# Patient Record
Sex: MALE | Race: OTHER | Employment: FULL TIME | URBAN - METROPOLITAN AREA
[De-identification: names, ages, dates, MRNs, and addresses within clinical notes are randomized per-mention and may not be internally consistent; named-entity substitution may affect disease eponyms.]

---

## 2019-04-05 ENCOUNTER — OFFICE VISIT (OUTPATIENT)
Dept: SURGERY | Age: 49
End: 2019-04-05

## 2019-04-05 ENCOUNTER — TELEPHONE (OUTPATIENT)
Dept: SURGERY | Age: 49
End: 2019-04-05

## 2019-04-05 VITALS
WEIGHT: 246.2 LBS | DIASTOLIC BLOOD PRESSURE: 83 MMHG | HEIGHT: 66 IN | SYSTOLIC BLOOD PRESSURE: 145 MMHG | BODY MASS INDEX: 39.57 KG/M2 | OXYGEN SATURATION: 99 % | TEMPERATURE: 97.8 F | HEART RATE: 62 BPM

## 2019-04-05 DIAGNOSIS — E66.01 MORBID OBESITY (HCC): Primary | ICD-10-CM

## 2019-04-05 DIAGNOSIS — K76.0 HEPATIC STEATOSIS: ICD-10-CM

## 2019-04-05 RX ORDER — AMOXICILLIN 500 MG/1
500 CAPSULE ORAL 3 TIMES DAILY
Qty: 42 CAP | Refills: 0 | Status: SHIPPED | OUTPATIENT
Start: 2019-04-05 | End: 2019-04-19

## 2019-04-05 RX ORDER — OMEPRAZOLE 20 MG/1
20 CAPSULE, DELAYED RELEASE ORAL 2 TIMES DAILY
Qty: 28 CAP | Refills: 0 | Status: SHIPPED | OUTPATIENT
Start: 2019-04-05 | End: 2019-04-19

## 2019-04-05 RX ORDER — CLARITHROMYCIN 500 MG/1
500 TABLET, FILM COATED ORAL 2 TIMES DAILY
Qty: 28 TAB | Refills: 0 | Status: SHIPPED | OUTPATIENT
Start: 2019-04-05 | End: 2019-04-19

## 2019-04-05 RX ORDER — AMLODIPINE BESYLATE 5 MG/1
TABLET ORAL
COMMUNITY
End: 2021-11-11

## 2019-04-05 NOTE — PROGRESS NOTES
Initial Consultation for Bariatric Surgery Template Sly Miller is a 52 y.o. [place] male initially evaluated for consideration of bariatric surgery at [facility] last year. The patient initiated the process there which she completed and his now opted to pursue further care at [facility]. Onset obesity: Age 39 weight 190 pounds on a 5 foot 4 inch frame. Weight at age 25: 160 pounds on a five-point frame. Maximum weight: 268 pounds in 2017. Current weight: 246 pounds on a 5 foot 4 inch frame with a body mass index of 40. Pattern weight gain: Slowly progressive interrupted by dietary weight loss followed by regaining the last weight as well as additional weight that is exhibiting the yo-yo effect up to his maximum weight of 268 pounds in 2017. Maximum medical weight loss attempts: Multiple unsupervised and supervised weight loss trials with a maximum loss occurring in 2017 losing 20 pounds over 6 months. Comorbidities: Hypertension, prediabetes, hypertriglyceridemia, hypercholesterolemia, gastroesophageal reflux disease, CPAP dependent obstructive sleep apnea, reactive airway disease, weight related arthropathyback. Current weight: 246. BMI: 40. Ideal body weight: 145. Excess body weight: 101. Estimated weight loss: 81. Goal weight: 165. Allergies: No known drug allergies. Current medications: See medication list. 
Past medical history: 1. Clinically severe obesity with body mass index of 40 and obesity related comorbidities hypertension, prediabetes, hypertriglyceridemia, hypercholesterolemia, gastroesophageal reflux disease, reactive airway disease, CPAP and obstructive sleep apnea, weight related arthropathyback. 2.  Anxiety/depression. 3.  Depression. Past surgical history: None. Social history: 
Alcohol: None. Tobacco: 1/2 pack of cigarettes daily for 10-year. Quitting in 1991. Family history: Mother 73healthy. Father 79prostate carcinoma, reactive airway disease. Siblings x5healthy Past Medical History:  
Diagnosis Date  Hyperlipidemia  Hypertension  Ill-defined condition   
 elevated cholesterol  Psychiatric disorder   
 anxiety with depression  Sleep apnea   
 uses sleep apnea Past Surgical History:  
Procedure Laterality Date  COLONOSCOPY N/A 2/1/2018 COLONOSCOPY, DIAGNOSTIC with polypectomy  performed by Sue Montalvo MD at [facility] ENDOSCOPY Current Outpatient Medications Medication Sig Dispense Refill  amLODIPine (NORVASC) 5 mg tablet amlodipine 5 mg tablet  atorvastatin (LIPITOR) 40 mg tablet Take 40 mg by mouth two (2) times a day.  ezetimibe (ZETIA) 10 mg tablet Take 10 mg by mouth daily.  sertraline (ZOLOFT) 100 mg tablet Take 200 mg by mouth nightly.  methocarbamol (ROBAXIN-750) 750 mg tablet Take 1 Tab by mouth four (4) times daily. 16 Tab 0  
 albuterol sulfate 90 mcg/actuation aepb Take 2 Puffs by inhalation every four (4) hours as needed. 1 Inhaler 0 No Known Allergies Social History Tobacco Use  Smoking status: Former Smoker  Smokeless tobacco: Never Used Substance Use Topics  Alcohol use: No  
 Drug use: No  
 
 
Family History Problem Relation Age of Onset  Asthma Mother  Cancer Mother  Cancer Father Family Status Relation Name Status  Mother  Alive  Father  Alive Review of Systems:  Positive in BOLD 
 
CONST: Fever, weight loss, fatigue or chills GI: Nausea, vomiting, abdominal pain, change in bowel habits, hematochezia, melena, and GERD INTEG: Dermatitis, abnormal moles HEENT: Recent changes in vision, vertigo, epistaxis, dysphagia and hoarseness CV: Chest pain, palpitations, HTN, edema and varicosities RESP: Cough, shortness of breath, wheezing, hemoptysis, snoring and reactive airway disease : Hematuria, dysuria, frequency, urgency, nocturia and stress urinary incontinence MS: Weakness, joint pain and arthritis ENDO: Diabetes, thyroid disease, polyuria, polydipsia, polyphagia, poor wound healing, heat intolerance, cold intolerance LYMPH/HEME: Anemia, bruising and history of blood transfusions NEURO: Dizziness, headache, fainting, seizures and stroke PSYCH: Anxiety and depression Physical Exam 
 
Visit Vitals /83 (BP 1 Location: Right arm, BP Patient Position: Sitting) Pulse 62 Temp 97.8 °F (36.6 °C) (Oral) Ht 5' 6\" (1.676 m) Wt 111.7 kg (246 lb 3.2 oz) SpO2 99% BMI 39.74 kg/m² General: 52 y.o.) male in no acute distress. Who is morbidly obese HEENT: Normocephalic, atraumatic, Pupils equal and reactive, nasopharynx clear, oropharynx clear and moist without lesions NECK: Supple, no lymphadenopathy, thyromegaly, carotid bruits or jugular venous distension. trachea midline RESP: Clear to auscultation bilaterally, no wheezes, rhonchi, or rales, normal respiratory excursion CV: Regular rate and rhythm, no murmurs, rubs or gallops. 3+/4 pulses in bilateral dorsalis pedis and posterior tibialis. No distal edema or varicosities. ABD: Soft, nontender, nondistended, normoactive bowel sounds, no hernias, no hepatosplenomegaly, Extremities: Warm, well perfused, no tenderness or swelling, normal gait/station Neuro: Sensation and strength grossly intact and symmetrical 
Psych: Alert and oriented to person, place, and time. Laboratory, radiologic, nutrition and psychology consultations have been completed, we are awaiting documentation Impression: 
 
Aline Miller is a 52 y.o. male with clinically severe obesity and a body mass index of 40 with obesity related comorbidities of hypertension, hypercholesterolemia, hypertriglyceridemia, prediabetes, gastroesophageal reflux disease, reactive airway disease, CPAP dependent obstructive sleep apnea, weight related arthropathy and hepatic steatosis    who would benefit from bariatric surgery. We have had an extensive discussion with regard to the risks, benefits and likely outcomes of the operation. We've discussed the restrictive and malabsorptive nature of the gastric bypass and compared and contrasted with the sleeve gastrectomy. The patient understands the likelihood of losing approximately 80% of their excess weight in 12 to 18 months. He also understands the risks including but not limited to bleeding, infection, need for reoperation, ulcers, leaks and strictures, bowel obstruction secondary to adhesions and internal hernias, DVT, PE, heart attack, stroke, and death. Patient also understands risks of inadequate weight loss, excess weight loss, vitamin insufficiency, protein malnutrition, excess skin, and loss of hair. We have reviewed the components of a successful postoperative course including requirement for a high protein, low carbohydrate diet, 60 oz a day of zero calorie liquids, daily vitamin supplementation, daily exercise, regular follow-up, and participation in support groups. At this time we will enroll the patient in our bariatric program, obtain the previously completed laboratory evaluation, chest X-ray, EKG,  and evaluation by  nutritionist as well as psychologist.  Patient has elected after completing his multidisciplinary evaluation to pursue lap scopic potentially open Say-en-Y gastric bypass to achieve definitive durable weight loss on a personal level with expected resolution of obesity related comorbidities

## 2019-04-05 NOTE — PROGRESS NOTES
Shauna Rivera is a 52 y.o. male (: 1970) presenting to address: Chief Complaint Patient presents with  Weight Management Consultation for GBP Medication list and allergies have been reviewed with Shauna Rivera and updated as of today's date. I have gone over all Medical, Surgical and Social History with Shauna Rivera and updated/added the information accordingly.

## 2019-04-05 NOTE — TELEPHONE ENCOUNTER
Patient called and spoke with his wife in regard to positive H-pylori. Notified her that we would be sending in the rx and verified pharmacy. Verbal understanding given by patients wife. Closing encounter.

## 2019-04-05 NOTE — PATIENT INSTRUCTIONS
If you have any questions or concerns about today's appointment, the verbal and/or written instructions you were given for follow up care, please call our office at [phone]. [facility] - [address], [address] 
 
[phone] office [phone]

## 2019-04-29 ENCOUNTER — DOCUMENTATION ONLY (OUTPATIENT)
Dept: SURGERY | Age: 49
End: 2019-04-29

## 2019-04-29 NOTE — PROGRESS NOTES
[address] [facility] [place] [address] [facility], [address]    Patient's Name: oKurtney Cox   Age: 52 y.o. YOB: 1970   Sex: male    Date:  04/11/2019    Insurance:  Medicare            Session: 1 of 4   Surgeon:  Dr. Nilesh Roca    Height: 5'6\"   Weight:    243      Lbs. BMI: 39     Starting Weight: 246       Do you smoke? no    Alcohol intake:    I do not drink at all. Class Guidelines    Guidelines are reviewed with patient at the start of every class. 1. Patient understands that weight loss trial classes must be consecutive. Patient understands if they miss a class, it is their responsibility to contact me to reschedule class. I will reach out to patient after their first no show. 2.  Patient understands the expectations that weight maintenance/weight loss is expected during the classes. Failure to demonstrate changes may result in one extra month of weight loss trial, followed by going back to see the surgeon. Patient understands that they CAN NOT gain any weight during the weight loss trial.  Gaining weight will result in extra classes. 3. Patient is also instructed to be doing their labs, blood work, psych visit, support group and any other test that the surgeon has used while they are working on their weight loss trial.  4.  Patient was instructed to bring their blue binder to every class and appointment. Changes Made Since Last Class: I stopped drinking caffeine. Eating Habits and Behaviors      We started off class today by reviewing key diet principles. Patient was given a very specific list of foods that they can eat, which included meat, fish, vegetables, eggs, cheese, fats, soy, and berries. Patient was also given a list of foods that should be avoided. These included sweets (candy, soda, baked goods, ice cream), and starches, including pasta, rice, crackers, chips, oatmeal, bread.   We talked about appropriate protein-based snacks, including deli meat, low fat cheese, yogurt, hummus, small handful of almonds. I also gave a power point on ways to help with the metabolism. Some of the food-related ways included: Healthy snacking, eating adequate protein, and getting adequate water. Mild dehydration may slow down one's weight loss. Patient's current diet habits include:   Breakfast:  Tuna, eggs, cheese. Lunch: fish, chicken, salmon. Dinner:  Chicken, fish, salmon. Physical Activity/Exercise    Comments:     Currently for exercise, patient is walking. We talked about activities for patient to do, including walking, swimming, or chair exercises. I also talked with patient about doing some strength training, which helps the metabolism, as well. Behavior Modification       Comments:  I discussed behaviors that can help with one's metabolism. These include not skipping meals and being sure to feed and fuel that body rather than going large gaps and putting the body in starvation mode. One goal for next month includes:  1.  Join a gym. Increase exercise.       Ozie Babinski, RD  04/11/2019

## 2019-05-22 ENCOUNTER — DOCUMENTATION ONLY (OUTPATIENT)
Dept: SURGERY | Age: 49
End: 2019-05-22

## 2019-05-22 NOTE — PROGRESS NOTES
[facility] [address]  [place], [place]    Patient's Name: Vasile Chi   Age: 52 y.o. YOB: 1970   Sex: male    Date:   05/09/2019    Insurance:  Medicare            Session: 2 of 4   Surgeon:  Dr. Yuliana Houston    Height: 5'6\"   Weight:    243      Lbs. BMI: 39     Starting Weight: 246        Do you smoke? no    Alcohol intake:    I do not drink at all. Class Guidelines    Guidelines are reviewed with patient at the start of every class. 1. Patient understands that weight loss trial classes must be consecutive. Patient understands if they miss a class, it is their responsibility to contact me to reschedule class. I will reach out to patient after their first no show. 2.  Patient understands the expectations that weight maintenance/weight loss is expected during the classes. Failure to demonstrate changes may result in one extra month of weight loss trial, followed by going back to see the surgeon. Patient understands that they CAN NOT gain any weight during the weight loss trial.  Gaining weight will result in extra classes. 3. Patient is also instructed to be doing their labs, blood work, psych visit, support group and any other test that the surgeon has used while they are working on their weight loss trial.  4.  Patient was instructed to bring their blue binder to every class and appointment. Changes Made Since Last Class:   Reducing portions. Eating Habits and Behaviors      Today in class we talked about the key diet principles. We start off each class talking about these principles, which include cutting out liquid calories and focusing on water or other non-calorie, non-carbonated drinks. We also spent time talking about carbohydrates, including foods that have carbohydrates and the goal to keep daily carbohydrates under 100 grams per day.   Patient was given ideas of meal and snack choices that are lower in carbohydrates and focus more on protein. Patient was encouraged to start trying protein shakes and was given a list of suggestions. The main topic of class today was: Portion Control. We reviewed in class a power point filled with tips on ways to control portions, including using smaller plates, boxing up portions at a restaurant before starting to eat, and not eating from the container, but rather portioning snacks into smaller bags. Patient's were encouraged to food journal, which helps increase awareness of what and how much they are eating. It was emphasized to patient the importance of reading labels and portion sizes, but also applying these portion sizes. Patient was given a list of items that can help to make portion control easier. For example, a deck of cards or a palm of a hand is a proper portion of meat, a fist is a cup or a proper serving of vegetables. Patient was given 10 tips to help with the portion control. Patient's current diet habits include:   Breakfast:always have breakfast.  Lunch:protein shake, fruit, yogurt. Dinner:always have dinner. Physical Activity/Exercise    Comments:     Currently for exercise, patient is walking. Patient was given a list of ideas for activity and was encouraged to incorporate 30 minutes a day into their daily routine. Behavior Modification       Comments: In class, we also focused on the behavior aspects of weight management. This includes being a mindful eater and not eating in front of the TV. Patient is also encouraged to take 20 minutes to eat a meal and eat at a table. One goal for next month includes:  1. Continue what I'm doing.       Owen Renee, CAN  05/09/2019

## 2019-06-24 ENCOUNTER — DOCUMENTATION ONLY (OUTPATIENT)
Dept: SURGERY | Age: 49
End: 2019-06-24

## 2019-06-24 NOTE — PROGRESS NOTES
[facility]  [address] [address], Formerly [facility], [facility]    Patient's Name: Caryn Harmon   Age: 52 y.o. YOB: 1970   Sex: male    Date: 06/13/2019    Insurance:  Daleen Kanner          Session: 3 of  4  Surgeon: Dr. Judith Shankar    Height: 5'6\" Weight:    239      Lbs. BMI: 39     Starting Weight: 246       Do you smoke? no    Alcohol intake:    I do not drink at all. Class Guidelines    Guidelines are reviewed with patient at the start of every class. 1. Patient understands that weight loss trial classes must be consecutive. Patient understands if they miss a class, it is their responsibility to contact me to reschedule class. I will reach out to patient after their first no show. 2.  Patient understands the expectations that weight maintenance/weight loss is expected during the classes. Failure to demonstrate changes may result in one extra month of weight loss trial, followed by going back to see the surgeon. Patient understands that they CAN NOT gain any weight during the weight loss trial.  Gaining weight will result in extra classes. 3. Patient is also instructed to be doing their labs, blood work, psych visit, support group and any other test that the surgeon has used while they are working on their weight loss trial.  4.  Patient was instructed to bring their blue binder to every class and appointment. Changes Made Since Last Class:   Consistent in fluids, exercise ans three meals. Eating Habits and Behaviors    Today in class, I reviewed a hand out with meal ideas for better planning. We also discussed Nutrition, Behavior, and Exercise changes to start working on. Some of the eating behaviors that we discussed included the importance of eating breakfast and a list of sample breakfast foods was provided. We also talked about avoiding liquid calories.   Patient is encouraged to aim for 64 ounces of fluid per day and trying to get them from water, tea bags and water infuser tools. Patient's current diet habits include: Three meals plus healthy between meal snacks. Physical Activity/Exercise    Comments: We talked about exercise. Patient was given reasons of why exercise is so important and how that can help with their long-term success. I have encouraged patient to get a support system to help with the activity. Currently for activity, patient is doing more walking. Behavior Modification       Comments: We also talked about behavior modifications. We talked about eating triggers, such as eating in front of the TV and solutions, such as making the TV a no eating zone. If patient is eating out out of emotion, food will only temporarily solve that. Patient is encouraged to HALT and assess if they are eating because they are Hungry, or out of emotions: Anxious, Lonely, Tired, which the food will only temporarily solve. We also talked about ways to prevent relapse.     Basim Grande, CAN  06/13/2019

## 2019-07-03 ENCOUNTER — DOCUMENTATION ONLY (OUTPATIENT)
Dept: SURGERY | Age: 49
End: 2019-07-03

## 2019-07-21 ENCOUNTER — DOCUMENTATION ONLY (OUTPATIENT)
Dept: SURGERY | Age: 49
End: 2019-07-21

## 2019-07-22 NOTE — PROGRESS NOTES
Nutrition Evaluation    Patient's Name: Chris Ramos   Age: 52 y.o. YOB: 1970   Sex: male    Height: 5'6\" Weight: 238 BMI:  38  Starting Weight:  246        Smoking Status:  no  Alcohol Intake:  I do not drink at all. Changes made during classes include:  Eat 3-4 times per day. Eliminated sweets. Drink lots of water. Two things that patient learned during this weight loss trial:  How to eat healthy. How to take better care of myself. Summary:  I feel that Chris Ramos has demonstrated appropriate diet changes and is ready to move forward with surgery. Patient has been briefed on the importance of the protein drinks, vitamins, and the transition of the diet stages. Patient understands that the long-term diet will focus on protein and vegetables. Patient understand the effects of carbohydrates after surgery and what reactive hypoglycemia is. Patient is aware that they will be attending pre-op class 2 weeks before surgery and will get more detailed information on the post-op diet guidelines. Patient will see me again at 6 weeks post-op. At this 6 week visit, RD will assess how patient is tolerating soft protein and advance to vegetables, if tolerating soft protein without difficulty. Patient will also see RD again at 9 months post-op. This visit will assess patient's compliance with current protocol, including diet, vitamins, protein shakes, and exercise. Post-op diet guidelines will be reinforced. RD is available for questions and to meet with patient outside of the 6 week and 9 month post-op visit. We spent a lot of time talking about the vitamins. Patient understands the importance of being compliant with the diet protocol and the complications and risks that can occur if they are non-compliant with the nutritional protocol. Patient has attended at least one support group.     Candidate for surgery:  Yes    Sony Cuellar RD  07/18/2019

## 2019-07-22 NOTE — PROGRESS NOTES
[facility] [place] [facility] [address]  [place], [place]    Patient's Name: Ness Smith   Age: 52 y.o. YOB: 1970   Sex: male    Date:   07/18/2019    Insurance:  Medicare            Session: 4 of 4   Surgeon: Dr. Jessica Mocnada     Height: 5'6\"   Weight:    238      Lbs. BMI: 39   Starting Weight: 246       Do you smoke? no    Alcohol intake:    I don't drink at all. Class Guidelines    Guidelines are reviewed with patient at the start of every class. 1. Patient understands that weight loss trial classes must be consecutive. Patient understands if they miss a class, it is their responsibility to contact me to reschedule class. I will reach out to patient after their first no show. 2.  Patient understands the expectations that weight maintenance/weight loss is expected during the classes. Failure to demonstrate changes may result in one extra month of weight loss trial, followed by going back to see the surgeon. Patient understands that they CAN NOT gain any weight during the weight loss trial.  Gaining weight will result in extra classes. 3. Patient is also instructed to be doing their labs, blood work, psych visit, support group and any other test that the surgeon has used while they are working on their weight loss trial.  4.  Patient was instructed to bring their blue binder to every class and appointment. Changes Made Since Last Class:   I eat 3-4 times per day. Walking more. Eating Habits and Behaviors    Today in class, we started talking about the key diet principles. We first focused on stopping liquid calories. Patient was also educated on carbohydrates. Patient was instructed to start cutting out bread, rice, and pasta from the diet and start focusing more on meat and vegetables. Patient's current diet habits include: Yogurt, shakes, cheese sticks, deli meat.         Physical Activity/Exercise    Comments: We talked about exercise. Patient was given reasons of why exercise is so important and how that can help with their long-term success. I have encouraged patient to get a support system to help with the activity. To emphasize the importance patients were provided a handout on all the benefits of exercise. Patient were also provided a handout on overcoming barriers to exercise which included and certificate of commitment for them to use as well as a form to track their exercise. All these handouts were discussed in detail. Currently for activity, patient is doing more walking. Behavior Modification       Comments:  Behavior modifications were reinforced. This included not eating in front of the TV, which could lead to bigger portions and eating when one is not hungry. We also talked about the importance of eating 3 meals per day. Patient was encouraged to food journal to keep their daily carbohydrates less than 30 grams per meal.      Goals that patient wants to work on includes:  1. Decrease portions. Conclusion:  Patient has completed a 4 month WLT and has managed to lose 8# implementing what was learned in class. The patient is cleared to proceed from a nutritional perspective.       Candice Cat RD

## 2019-08-07 ENCOUNTER — OFFICE VISIT (OUTPATIENT)
Dept: SURGERY | Age: 49
End: 2019-08-07

## 2019-08-07 VITALS
SYSTOLIC BLOOD PRESSURE: 127 MMHG | HEART RATE: 68 BPM | DIASTOLIC BLOOD PRESSURE: 82 MMHG | HEIGHT: 66 IN | TEMPERATURE: 97.1 F | OXYGEN SATURATION: 96 % | WEIGHT: 245 LBS | BODY MASS INDEX: 39.37 KG/M2

## 2019-08-07 DIAGNOSIS — E78.00 HYPERCHOLESTEREMIA: ICD-10-CM

## 2019-08-07 DIAGNOSIS — E78.1 HYPERTRIGLYCERIDEMIA: ICD-10-CM

## 2019-08-07 DIAGNOSIS — G47.33 OBSTRUCTIVE SLEEP APNEA (ADULT) (PEDIATRIC): ICD-10-CM

## 2019-08-07 DIAGNOSIS — K21.9 GASTROESOPHAGEAL REFLUX DISEASE WITHOUT ESOPHAGITIS: ICD-10-CM

## 2019-08-07 DIAGNOSIS — I10 BENIGN HYPERTENSION: ICD-10-CM

## 2019-08-07 DIAGNOSIS — E66.01 MORBID OBESITY (HCC): Primary | ICD-10-CM

## 2019-08-07 NOTE — PROGRESS NOTES
Luis Rose is a 52 y.o. male (: 1970) presenting to address:    Chief Complaint   Patient presents with    Pre-op Exam    Weight Management       Medication list and allergies have been reviewed with Luis Rose and updated as of today's date. I have gone over all Medical, Surgical and Social History with Luis Rose and updated/added the information accordingly. 1. Have you been to the ER, Urgent Care or Hospitalized since your last visit? NO      2. Have you followed up with your PCP or any other Physicians since your procedure/ last office visit?    NO

## 2019-08-07 NOTE — PROGRESS NOTES
Preop History and Physical Exam:    Randolph Barboza is a 52 y.o. male who was initially evaluated in this office on 5 April 2019 for discussion of surgical options available for the definitive treatment of his clinically severe obesity. 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 After discussion of the surgical options the patient at that time like to pursue laparoscopic potentially open Say-en-Y gastric bypass to achieve definitive weight loss on a personal as expected resolution of his obesity related comorbidities. The patient presents today after completing all multidisciplinary requirements necessary prior to pursuit of surgery for review of the diagnostic work-up and surgical scheduling noting no new medical or surgical history. Current weight: 245 pounds on a 5 foot 8 and frame with a body mass index of 40, ideal body weight 145, excess body weight 101, estimated postsurgical weight loss, 80 estimate postsurgical goal weight 164      Past Medical History:   Diagnosis Date    Hyperlipidemia     Hypertension     Ill-defined condition     elevated cholesterol    Psychiatric disorder     anxiety with depression    Sleep apnea     uses sleep apnea       Past Surgical History:   Procedure Laterality Date    COLONOSCOPY N/A 2/1/2018    COLONOSCOPY, DIAGNOSTIC with polypectomy  performed by Jayla Romo MD at [facility] ENDOSCOPY       Current Outpatient Medications   Medication Sig Dispense Refill    amLODIPine (NORVASC) 5 mg tablet amlodipine 5 mg tablet      methocarbamol (ROBAXIN-750) 750 mg tablet Take 1 Tab by mouth four (4) times daily.  16 Tab 0    albuterol sulfate 90 mcg/actuation aepb Take 2 Puffs by inhalation every four (4) hours as needed. 1 Inhaler 0    atorvastatin (LIPITOR) 40 mg tablet Take 40 mg by mouth two (2) times a day.  ezetimibe (ZETIA) 10 mg tablet Take 10 mg by mouth daily.  sertraline (ZOLOFT) 100 mg tablet Take 200 mg by mouth nightly.          No Known Allergies    Social History     Tobacco Use    Smoking status: Former Smoker    Smokeless tobacco: Never Used   Substance Use Topics    Alcohol use: No    Drug use: No       Family History   Problem Relation Age of Onset    Asthma Mother     Cancer Mother     Cancer Father        Review of Systems:  Positive in BOLD    CONST: Fever, weight loss, fatigue or chills  GI: Nausea, vomiting, abdominal pain, change in bowel habits, hematochezia, melena, and GERD   INTEG: Dermatitis, abnormal moles  HEENT: Recent changes in vision, vertigo, epistaxis, dysphagia and hoarseness  CV: Chest pain, palpitations, HTN, edema and varicosities  RESP: Cough, shortness of breath, wheezing, hemoptysis, snoring and reactive airway disease  : Hematuria, dysuria, frequency, urgency, nocturia and stress urinary incontinence   MS: Weakness, joint pain and arthritis  ENDO: Diabetes, thyroid disease, polyuria, polydipsia, polyphagia, poor wound healing, heat intolerance, cold intolerance  LYMPH/HEME: Anemia, bruising and history of blood transfusions  NEURO: Dizziness, headache, fainting, seizures and stroke  PSYCH: Anxiety and depression    Physical Exam    Visit Vitals  /82 (BP 1 Location: Right arm, BP Patient Position: Sitting)   Pulse 68   Temp 97.1 °F (36.2 °C) (Oral)   Ht 5' 6\" (1.676 m)   Wt 111.1 kg (245 lb)   SpO2 96%   BMI 39.54 kg/m²         General: Morbidly obese 52 y.o.) male in no acute distress  Head: Normocephalic, atraumatic  Mouth: Clear, no overt lesions, oral mucosa pink and moist  Neck: Supple, no masses, no adenopathy or carotid bruits, trachea midline  Resp: Clear to auscultation bilaterally, now wheeze, rhonchi, or rales, excursions normal and symmetrical  Cardio: Regular rate and rhythm, no murmurs, clicks, gallops, or rubs. No edema or varicosities. Abdomen: Obese, soft, nontender, nondistended, normoactive bowel sounds, no hernias, no hepatosplenomegaly,costal margins, (gynecoid/android/mixed) distribution  Extremities: Warm, well perfused, no tenderness or swelling, normal gait/station  Neuro: Sensation and strength grossly intact and symmetrical  Psych: Alert and oriented to person, place, and time. Studies:    March 20, 2019 CBC, CMP, cholesterol panel, TSH, urinalysis, H. pylori serology, vitamin B1, vitamin B12, folate, iron, hemoglobin A1c                             Carboxyhemoglobin 4.0, IgG H. pylori 4.89 status post treatment, folate 19.4, triglycerides 221 with otherwise a normal laboratory profile  May 22, 2019 chest x-ray: No active cardiopulmonary disease. May 18, 2018 of current ultrasound: Fatty infiltration of the liver  6/24/2019 nutrition consultation concurring with pursuit of surgery. March 14, 2019 psychology consultation: Concurring with pursuit of surgery. March 20, 2019 EKG: Normal sinus rhythm with a rate of 59, right bundle branch block, T wave abnormality    Impression:    Michelle Parrish is a 52 y.o. male who is suffering from morbid obesity and comorbidities including hypertension, hypertriglyceridemia, hypercholesterolemia, gastroesophageal reflux disease, reactive airway disease, obstructive sleep apnea, weight related arthropathy-back who has now completed all multidisciplinary requirements necessary prior to the pursuit of bariatric surgery. We've discussed the restrictive and malabsorptive nature of the gastric bypass and compared and contrasted with the sleeve gastrectomy. The patient understands the likelihood of losing approximately 80% of their excess weight in 12 to 18 months.   Patient is like to pursue laparoscopic potentially open Say-en-Y gastric bypass to achieve definitive durable weight loss on a personal level with expected resolution of obesity related comorbidities and  understands the risks including but not limited to bleeding, infection, need for reoperation, anastomotic ulcers, leaks and strictures, bowel obstruction secondary to adhesions and internal hernias, DVT, PE, heart attack, stroke, and death. Patient also understands risks of inadequate weight loss, excess weight loss, vitamin insufficiency, protein malnutrition, excess skin, and loss of hair. We have reviewed the components of a successful postoperative course including requirement for a high protein, low carbohydrate diet, 60 oz a day of zero calorie liquids, daily vitamin supplementation, daily exercise, regular follow-up, and participation in support groups. We have reviewed the preoperative liver shrinking clear liquid diet, as well as reviewed any medication changes required while on the clear liquid diet. In addition, the patient understands that all medications to be taken during the first 8 weeks postoperatively can be taken whole as long as the medication is approximately the size of a Calixto 325 mg aspirin tablet in size. The patient further understands that it is his/her responsibility to review these and verify with their primary care doctor and pharmacist that all medications are of the appropriate size. We will schedule the patient for laparoscopic gastric bypass  in the near future.

## 2019-08-08 ENCOUNTER — HOSPITAL ENCOUNTER (OUTPATIENT)
Dept: PREADMISSION TESTING | Age: 49
Discharge: HOME | End: 2019-08-08
Payer: MEDICARE

## 2019-08-08 DIAGNOSIS — E78.00 HYPERCHOLESTEREMIA: ICD-10-CM

## 2019-08-08 DIAGNOSIS — I10 BENIGN HYPERTENSION: ICD-10-CM

## 2019-08-08 DIAGNOSIS — K21.9 GASTROESOPHAGEAL REFLUX DISEASE WITHOUT ESOPHAGITIS: ICD-10-CM

## 2019-08-08 DIAGNOSIS — E66.01 SEVERE OBESITY (HCC): Primary | ICD-10-CM

## 2019-08-08 DIAGNOSIS — E78.1 HYPERTRIGLYCERIDEMIA: ICD-10-CM

## 2019-08-08 DIAGNOSIS — G47.33 OBSTRUCTIVE SLEEP APNEA (ADULT) (PEDIATRIC): ICD-10-CM

## 2019-08-08 DIAGNOSIS — E66.01 SEVERE OBESITY (HCC): ICD-10-CM

## 2019-08-08 LAB
25(OH)D3 SERPL-MCNC: 16.9 NG/ML (ref 30–100)
ALBUMIN SERPL-MCNC: 3.8 G/DL (ref 3.4–5)
ALBUMIN/GLOB SERPL: 1.1 {RATIO} (ref 0.8–1.7)
ALP SERPL-CCNC: 99 U/L (ref 45–117)
ALT SERPL-CCNC: 35 U/L (ref 16–61)
ANION GAP SERPL CALC-SCNC: 6 MMOL/L (ref 3–18)
APPEARANCE UR: CLEAR
AST SERPL-CCNC: 21 U/L (ref 10–38)
ATRIAL RATE: 57 BPM
BACTERIA URNS QL MICRO: NEGATIVE /HPF
BASOPHILS # BLD: 0 K/UL (ref 0–0.1)
BASOPHILS NFR BLD: 1 % (ref 0–2)
BILIRUB SERPL-MCNC: 0.3 MG/DL (ref 0.2–1)
BILIRUB UR QL: NEGATIVE
BUN SERPL-MCNC: 12 MG/DL (ref 7–18)
BUN/CREAT SERPL: 12 (ref 12–20)
CALCIUM SERPL-MCNC: 9.2 MG/DL (ref 8.5–10.1)
CALCULATED P AXIS, ECG09: 61 DEGREES
CALCULATED R AXIS, ECG10: 60 DEGREES
CALCULATED T AXIS, ECG11: 84 DEGREES
CHLORIDE SERPL-SCNC: 103 MMOL/L (ref 100–111)
CHOLEST SERPL-MCNC: 285 MG/DL
CO2 SERPL-SCNC: 29 MMOL/L (ref 21–32)
COLOR UR: YELLOW
CREAT SERPL-MCNC: 1.01 MG/DL (ref 0.6–1.3)
DIAGNOSIS, 93000: NORMAL
DIFFERENTIAL METHOD BLD: ABNORMAL
EOSINOPHIL # BLD: 0.1 K/UL (ref 0–0.4)
EOSINOPHIL NFR BLD: 2 % (ref 0–5)
EPITH CASTS URNS QL MICRO: NEGATIVE /LPF (ref 0–5)
ERYTHROCYTE [DISTWIDTH] IN BLOOD BY AUTOMATED COUNT: 13 % (ref 11.6–14.5)
EST. AVERAGE GLUCOSE BLD GHB EST-MCNC: 120 MG/DL
FERRITIN SERPL-MCNC: 145 NG/ML (ref 8–388)
FOLATE SERPL-MCNC: >20 NG/ML (ref 3.1–17.5)
GLOBULIN SER CALC-MCNC: 3.5 G/DL (ref 2–4)
GLUCOSE SERPL-MCNC: 109 MG/DL (ref 74–99)
GLUCOSE UR STRIP.AUTO-MCNC: NEGATIVE MG/DL
HBA1C MFR BLD: 5.8 % (ref 4.2–5.6)
HCT VFR BLD AUTO: 43.9 % (ref 36–48)
HDLC SERPL-MCNC: 39 MG/DL (ref 40–60)
HDLC SERPL: 7.3 {RATIO} (ref 0–5)
HGB BLD-MCNC: 15.1 G/DL (ref 13–16)
HGB UR QL STRIP: NEGATIVE
IRON SERPL-MCNC: 105 UG/DL (ref 50–175)
KETONES UR QL STRIP.AUTO: NEGATIVE MG/DL
LDLC SERPL CALC-MCNC: ABNORMAL MG/DL (ref 0–100)
LEUKOCYTE ESTERASE UR QL STRIP.AUTO: NEGATIVE
LIPID PROFILE,FLP: ABNORMAL
LYMPHOCYTES # BLD: 1.9 K/UL (ref 0.9–3.6)
LYMPHOCYTES NFR BLD: 30 % (ref 21–52)
MCH RBC QN AUTO: 30.5 PG (ref 24–34)
MCHC RBC AUTO-ENTMCNC: 34.4 G/DL (ref 31–37)
MCV RBC AUTO: 88.7 FL (ref 74–97)
MONOCYTES # BLD: 0.5 K/UL (ref 0.05–1.2)
MONOCYTES NFR BLD: 8 % (ref 3–10)
NEUTS SEG # BLD: 3.8 K/UL (ref 1.8–8)
NEUTS SEG NFR BLD: 59 % (ref 40–73)
NITRITE UR QL STRIP.AUTO: NEGATIVE
P-R INTERVAL, ECG05: 142 MS
PH UR STRIP: 7.5 [PH] (ref 5–8)
PLATELET # BLD AUTO: 268 K/UL (ref 135–420)
PMV BLD AUTO: 12.3 FL (ref 9.2–11.8)
POTASSIUM SERPL-SCNC: 4.2 MMOL/L (ref 3.5–5.5)
PROT SERPL-MCNC: 7.3 G/DL (ref 6.4–8.2)
PROT UR STRIP-MCNC: NEGATIVE MG/DL
Q-T INTERVAL, ECG07: 410 MS
QRS DURATION, ECG06: 102 MS
QTC CALCULATION (BEZET), ECG08: 399 MS
RBC # BLD AUTO: 4.95 M/UL (ref 4.7–5.5)
RBC #/AREA URNS HPF: NEGATIVE /HPF (ref 0–5)
SODIUM SERPL-SCNC: 138 MMOL/L (ref 136–145)
SP GR UR REFRACTOMETRY: 1.01 (ref 1–1.03)
TRIGL SERPL-MCNC: 471 MG/DL (ref ?–150)
TSH SERPL DL<=0.05 MIU/L-ACNC: 0.86 UIU/ML (ref 0.36–3.74)
UROBILINOGEN UR QL STRIP.AUTO: 0.2 EU/DL (ref 0.2–1)
VENTRICULAR RATE, ECG03: 57 BPM
VIT B12 SERPL-MCNC: 828 PG/ML (ref 211–911)
VLDLC SERPL CALC-MCNC: ABNORMAL MG/DL
WBC # BLD AUTO: 6.4 K/UL (ref 4.6–13.2)
WBC URNS QL MICRO: NEGATIVE /HPF (ref 0–4)

## 2019-08-08 PROCEDURE — 83540 ASSAY OF IRON: CPT

## 2019-08-08 PROCEDURE — 83036 HEMOGLOBIN GLYCOSYLATED A1C: CPT

## 2019-08-08 PROCEDURE — 80053 COMPREHEN METABOLIC PANEL: CPT

## 2019-08-08 PROCEDURE — 82306 VITAMIN D 25 HYDROXY: CPT

## 2019-08-08 PROCEDURE — 82607 VITAMIN B-12: CPT

## 2019-08-08 PROCEDURE — 84443 ASSAY THYROID STIM HORMONE: CPT

## 2019-08-08 PROCEDURE — 81001 URINALYSIS AUTO W/SCOPE: CPT

## 2019-08-08 PROCEDURE — 86677 HELICOBACTER PYLORI ANTIBODY: CPT

## 2019-08-08 PROCEDURE — 82728 ASSAY OF FERRITIN: CPT

## 2019-08-08 PROCEDURE — 93005 ELECTROCARDIOGRAM TRACING: CPT

## 2019-08-08 PROCEDURE — 85025 COMPLETE CBC W/AUTO DIFF WBC: CPT

## 2019-08-08 PROCEDURE — 80061 LIPID PANEL: CPT

## 2019-08-08 PROCEDURE — 36415 COLL VENOUS BLD VENIPUNCTURE: CPT

## 2019-08-08 PROCEDURE — 84425 ASSAY OF VITAMIN B-1: CPT

## 2019-08-09 LAB
H PYLORI IGA SER-ACNC: 12.8 UNITS (ref 0–8.9)
H PYLORI IGG SER IA-ACNC: 2.9 INDEX VALUE (ref 0–0.79)
H PYLORI IGM SER-ACNC: 11.3 UNITS (ref 0–8.9)

## 2019-08-10 LAB — VIT B1 BLD-SCNC: 106.4 NMOL/L (ref 66.5–200)

## 2019-08-12 ENCOUNTER — TELEPHONE (OUTPATIENT)
Dept: SURGERY | Age: 49
End: 2019-08-12

## 2019-08-12 DIAGNOSIS — E66.01 SEVERE OBESITY (HCC): ICD-10-CM

## 2019-08-12 DIAGNOSIS — A04.8 H. PYLORI INFECTION: Primary | ICD-10-CM

## 2019-08-12 RX ORDER — OMEPRAZOLE 20 MG/1
20 CAPSULE, DELAYED RELEASE ORAL DAILY
Qty: 14 CAP | Refills: 0 | Status: SHIPPED | OUTPATIENT
Start: 2019-08-12 | End: 2019-08-26

## 2019-08-12 RX ORDER — METRONIDAZOLE 500 MG/1
500 TABLET ORAL 3 TIMES DAILY
Qty: 42 TAB | Refills: 0 | Status: SHIPPED | OUTPATIENT
Start: 2019-08-12 | End: 2019-08-26

## 2019-08-12 RX ORDER — TETRACYCLINE HYDROCHLORIDE 500 MG/1
500 CAPSULE ORAL
Qty: 56 CAP | Refills: 0 | Status: SHIPPED | OUTPATIENT
Start: 2019-08-12 | End: 2019-08-26

## 2019-08-12 NOTE — TELEPHONE ENCOUNTER
When placing medications for H Pylori protocol Amoxicillin and Clarithromycin came up as being a high contraindication with patient's blood pressure medications. Chose the alternative of Tetracycline and Flagyl. Closing encounter.

## 2019-08-12 NOTE — TELEPHONE ENCOUNTER
----- Message from Bere Parisi DO sent at 8/12/2019  8:55 AM EDT -----  Needs vitamin D supplementation, H. pylori treatment

## 2019-08-12 NOTE — TELEPHONE ENCOUNTER
Patient has been called, was currently driving, spoke with Patient's wife and patient, Patient's wife placed me on speaker. Notified to start 5000 u of Vitamin D every day. Notified of H. Pylori and that we would be sending in the medication today. Verbal understanding was given by both patient and his wife. Pharmacy confirmed with patient's wife. Closing encounter.

## 2019-08-13 NOTE — PROGRESS NOTES
8/13/19:  Patient was contacted regarding his Vitamin D level. Patient's level was 16.9. Normal: . Patient was instructed to start 5000 IU per day. Patient understood these instructions.     Elizabeth Andersen MS RD

## 2019-08-20 ENCOUNTER — DOCUMENTATION ONLY (OUTPATIENT)
Dept: BARIATRICS/WEIGHT MGMT | Age: 49
End: 2019-08-20

## 2019-08-20 ENCOUNTER — DOCUMENTATION ONLY (OUTPATIENT)
Dept: MEDSURG UNIT | Age: 49
End: 2019-08-20

## 2019-08-20 NOTE — PROGRESS NOTES
CLINICAL NUTRITION PRE-OPERATIVE EDUCATION    Patient's Name: Jojo Mahoney   Age: 52 y.o. YOB: 1970   Sex: male    Education & Materials Provided:   - Soft Protein Diet Shopping List  -  Supplemental Resource Guide: MVI, B12, Calcium Citrate, Vitamin D, Vitamin B1,   and iron recommendations  - Protein Supplement Information  - Fluid Requirements/ No Straws  - No Caffeine or Carbonation   - No alcohol              - No Snacks or No Concentrated Sweets     - Exercising   - Support System at [employer] [facility] of Support Group meetings. Support System after surgery includes: x     - Key Diet Principles            - Addressed Current Habits/Changes to Make   - Patient has been educated on the liquid diet to begin 1 week prior to surgery. Attendance of support group: Yes    Summary:  Patient has completed 4 visits with the Registered Dietitian. During these nutrition visits, we focused on dietary changes, behavior changes, and the importance of establishing an exercise routine. The diet protocol that patient was prescribed emphasized on low carbohydrates (less than 100 grams per day prior to surgery and 60-80 grams of protein per day). At today's session, patient was educated on the post-op diet protocol. Patient understands the importance of keeping total fat and sugar less than 3 grams per serving. Patient is aware of the transition of the diet stages and is aware that they will be on clear liquids for 7days, followed by soft protein for 5 weeks. Patient understands the body needs ~ 60-70 grams of protein per day. During the liquid phase, patient will need 60 grams of protein from shakes. Once eating soft protein, patient will only need 1 shake per day. Patient is aware of the importance of the vitamins and protein drinks. We spent a lot of time talking about the vitamins.   Patient understands the importance of being compliant with the diet protocol and the complications and risks that can occur if they are non-compliant with the nutritional protocol and non-compliant with the vitamins. Patient has also been educated on the pre-op liquid diet for 1 week. Patient understands that failure to comply in pre-op liquid diet could result in surgery being canceled. Patient's 6 week post-op nutrition appointment has been scheduled. At this 6 week visit, RD will assess how patient is tolerating soft protein and advance to vegetables, if tolerating soft protein without difficulty. Patient will also see RD again at 9 months post-op. This visit will assess patient's compliance with current protocol, including diet, vitamins, protein shakes, and exercise. Post-op diet guidelines will be reinforced. RD is available for questions and to meet with patient outside of the 6 week and 9 month post-op visit. Ok to proceed.      Candidate for surgery: Yes  Re-evaluation Date:     Comfort Valencia 87 RD  8/20/2019

## 2019-08-20 NOTE — PROGRESS NOTES
Attended pre op class. Patient was educated on instructions below. Patient was provided a copy and all instructions were understood. Patient  was provided a copy and instructed to call with any questions. Patient was provided phone number to call. Patient verbalized understanding.

## 2019-08-20 NOTE — PROGRESS NOTES
CLINICAL NUTRITION PRE-OPERATIVE EDUCATION    Patient's Name: Andria Cid   Age: 52 y.o. YOB: 1970   Sex: male    Education & Materials Provided:   - Soft Protein Diet Shopping List  -  Supplemental Resource Guide: MVI, B12, Calcium Citrate, Vitamin D, Vitamin B1,   and iron recommendations  - Protein Supplement Information  - Fluid Requirements/ No Straws  - No Caffeine or Carbonation   - No alcohol              - No Snacks or No Concentrated Sweets     - Exercising   - Support System at [employer] [facility] of Support Group meetings. Support System after surgery includes: x     - Key Diet Principles            - Addressed Current Habits/Changes to Make   - Patient has been educated on the liquid diet to begin 1 week prior to surgery. Attendance of support group: Yes    Summary:  Patient has completed 6 visits with the Registered Dietitian. During these nutrition visits, we focused on dietary changes, behavior changes, and the importance of establishing an exercise routine. The diet protocol that patient was prescribed emphasized on low carbohydrates (less than 100 grams per day prior to surgery and 60-80 grams of protein per day). At today's session, patient was educated on the post-op diet protocol. Patient understands the importance of keeping total fat and sugar less than 3 grams per serving. Patient is aware of the transition of the diet stages and is aware that they will be on clear liquids for 7days, followed by soft protein for 5 weeks. Patient understands the body needs ~ 60-70 grams of protein per day. During the liquid phase, patient will need 60 grams of protein from shakes. Once eating soft protein, patient will only need 1 shake per day. Patient is aware of the importance of the vitamins and protein drinks. We spent a lot of time talking about the vitamins.   Patient understands the importance of being compliant with the diet protocol and the complications and risks that can occur if they are non-compliant with the nutritional protocol and non-compliant with the vitamins. Patient has also been educated on the pre-op liquid diet for 1 week. Patient understands that failure to comply in pre-op liquid diet could result in surgery being canceled. Patient's 6 week post-op nutrition appointment has been scheduled. At this 6 week visit, RD will assess how patient is tolerating soft protein and advance to vegetables, if tolerating soft protein without difficulty. Patient will also see RD again at 9 months post-op. This visit will assess patient's compliance with current protocol, including diet, vitamins, protein shakes, and exercise. Post-op diet guidelines will be reinforced. RD is available for questions and to meet with patient outside of the 6 week and 9 month post-op visit. Ok to proceed.      Candidate for surgery: Yes  Re-evaluation Date:     Comfort Tolbert 87 RD  8/20/2019

## 2019-08-30 ENCOUNTER — TELEPHONE (OUTPATIENT)
Dept: SURGERY | Age: 49
End: 2019-08-30

## 2019-08-30 NOTE — TELEPHONE ENCOUNTER
Patient wife call today wants to know why her  bypass was cancel and when will it be reschedule patient wife was really upset she stated she was told to continue clear liquid diet patient has several questions she wants someone to give them a call back.

## 2019-09-05 NOTE — PERIOP NOTES
PAT - SURGICAL PRE-ADMISSION INSTRUCTIONS    NAME:  Mark Anthony Tian                                                          TODAY'S DATE:  9/5/2019    SURGERY DATE:  9/9/2019                                  SURGERY ARRIVAL TIME:   TBV    1. Do NOT eat or drink anything, including candy or gum, after MIDNIGHT on 09/08/2019 , unless you have specific instructions from your Surgeon or Anesthesia Provider to do so. 2. No smoking on the day of surgery. 3. No alcohol 24 hours prior to the day of surgery. 4. No recreational drugs for one week prior to the day of surgery. 5. Leave all valuables, including money/purse, at home. 6. Remove all jewelry, nail polish, makeup (including mascara); no lotions, powders, deodorant, or perfume/cologne/after shave. 7. Glasses/Contact lenses and Dentures may be worn to the hospital.  They will be removed prior to surgery. 8. Call your doctor if symptoms of a cold or illness develop within 24 ours prior to surgery. 9. AN ADULT MUST DRIVE YOU HOME AFTER OUTPATIENT SURGERY. 10. If you are having an OUTPATIENT procedure, please make arrangements for a responsible adult to be with you for 24 hours after your surgery. 11. If you are admitted to the hospital, you will be assigned to a bed after surgery is complete. Normally a family member will not be able to see you until you are in your assigned bed. 15. Family is encouraged to accompany you to the hospital.  We ask visitors in the treatment area to be limited to ONE person at a time to ensure patient privacy. EXCEPTIONS WILL BE MADE AS NEEDED. 15. Children under 12 are discouraged from entering the treatment area and need to be supervised by an adult when in the waiting room.     Special Instructions:    Bring CPAP., Take these medications the morning of surgery with a sip of water:  Amlodipine    Patient Prep:    shower with anti-bacterial soap    These surgical instructions were reviewed with Jayden Juárez (Spouse) during the PAT phone call. Directions: On the morning of surgery, please go to the [address] [facility]. Enter the building from the [facility] entrance, 1st floor (next to the Emergency Room entrance). Take the elevator to the 2nd floor. Sign in at the Registration Desk.     If you have any questions and/or concerns, please do not hesitate to call:  (Prior to the day of surgery)  [facility] unit:  [phone]  (Day of surgery)  [facility] unit:  [phone]

## 2019-09-06 ENCOUNTER — ANESTHESIA EVENT (OUTPATIENT)
Dept: SURGERY | Age: 49
DRG: 621 | End: 2019-09-06
Payer: MEDICARE

## 2019-09-09 ENCOUNTER — HOSPITAL ENCOUNTER (INPATIENT)
Age: 49
LOS: 1 days | Discharge: HOME | DRG: 621 | End: 2019-09-10
Attending: SURGERY | Admitting: SURGERY
Payer: MEDICARE

## 2019-09-09 ENCOUNTER — ANESTHESIA (OUTPATIENT)
Dept: SURGERY | Age: 49
DRG: 621 | End: 2019-09-09
Payer: MEDICARE

## 2019-09-09 DIAGNOSIS — Z98.84 S/P GASTRIC BYPASS: Primary | ICD-10-CM

## 2019-09-09 PROBLEM — E66.01 MORBID OBESITY WITH BMI OF 40.0-44.9, ADULT (HCC): Status: ACTIVE | Noted: 2019-09-09

## 2019-09-09 PROCEDURE — 77030002996 HC SUT SLK J&J -A: Performed by: SURGERY

## 2019-09-09 PROCEDURE — 77030009968 HC RELD STPLR ENDOSC J&J -D: Performed by: SURGERY

## 2019-09-09 PROCEDURE — 77030033639 HC SHR ENDO COAG HARM 36 J&J -E: Performed by: SURGERY

## 2019-09-09 PROCEDURE — 74011250636 HC RX REV CODE- 250/636

## 2019-09-09 PROCEDURE — 77030032490 HC SLV COMPR SCD KNE COVD -B: Performed by: SURGERY

## 2019-09-09 PROCEDURE — 76010000132 HC OR TIME 2.5 TO 3 HR: Performed by: SURGERY

## 2019-09-09 PROCEDURE — 74011000250 HC RX REV CODE- 250: Performed by: SURGERY

## 2019-09-09 PROCEDURE — 74011250636 HC RX REV CODE- 250/636: Performed by: NURSE ANESTHETIST, CERTIFIED REGISTERED

## 2019-09-09 PROCEDURE — 77030027138 HC INCENT SPIROMETER -A

## 2019-09-09 PROCEDURE — 77030008598 HC TRCR ENDOSC BLDLS J&J -B: Performed by: SURGERY

## 2019-09-09 PROCEDURE — 76060000036 HC ANESTHESIA 2.5 TO 3 HR: Performed by: SURGERY

## 2019-09-09 PROCEDURE — 77030008437 HC REINF STRP REINF SEMGD WLGO -C: Performed by: SURGERY

## 2019-09-09 PROCEDURE — 74011000250 HC RX REV CODE- 250

## 2019-09-09 PROCEDURE — 77030002933 HC SUT MCRYL J&J -A: Performed by: SURGERY

## 2019-09-09 PROCEDURE — 74011250636 HC RX REV CODE- 250/636: Performed by: SURGERY

## 2019-09-09 PROCEDURE — 77030005518 HC CATH URETH FOL 2W BARD -B: Performed by: SURGERY

## 2019-09-09 PROCEDURE — 77030027876 HC STPLR ENDOSC FLX PWR J&J -G1: Performed by: SURGERY

## 2019-09-09 PROCEDURE — 77030027491 HC SHR ENDOSC COVD -B: Performed by: SURGERY

## 2019-09-09 PROCEDURE — 74011250637 HC RX REV CODE- 250/637: Performed by: SURGERY

## 2019-09-09 PROCEDURE — 0D164ZA BYPASS STOMACH TO JEJUNUM, PERCUTANEOUS ENDOSCOPIC APPROACH: ICD-10-PCS | Performed by: SURGERY

## 2019-09-09 PROCEDURE — 74011250636 HC RX REV CODE- 250/636: Performed by: ANESTHESIOLOGY

## 2019-09-09 PROCEDURE — 74011000272 HC RX REV CODE- 272: Performed by: SURGERY

## 2019-09-09 PROCEDURE — 76210000016 HC OR PH I REC 1 TO 1.5 HR: Performed by: SURGERY

## 2019-09-09 PROCEDURE — 65270000029 HC RM PRIVATE

## 2019-09-09 PROCEDURE — 77030008603 HC TRCR ENDOSC EPATH J&J -C: Performed by: SURGERY

## 2019-09-09 PROCEDURE — 77030008602 HC TRCR ENDOSC EPATH J&J -B: Performed by: SURGERY

## 2019-09-09 PROCEDURE — 77030018831 HC SOL IRR H20 BAXT -A: Performed by: SURGERY

## 2019-09-09 PROCEDURE — 77030036732 HC RELD STPLR VASC J&J -F: Performed by: SURGERY

## 2019-09-09 PROCEDURE — 77030031139 HC SUT VCRL2 J&J -A: Performed by: SURGERY

## 2019-09-09 RX ORDER — SUCCINYLCHOLINE CHLORIDE 100 MG/5ML
SYRINGE (ML) INTRAVENOUS AS NEEDED
Status: DISCONTINUED | OUTPATIENT
Start: 2019-09-09 | End: 2019-09-09 | Stop reason: HOSPADM

## 2019-09-09 RX ORDER — HYDROMORPHONE HYDROCHLORIDE 1 MG/ML
INJECTION, SOLUTION INTRAMUSCULAR; INTRAVENOUS; SUBCUTANEOUS AS NEEDED
Status: DISCONTINUED | OUTPATIENT
Start: 2019-09-09 | End: 2019-09-09 | Stop reason: HOSPADM

## 2019-09-09 RX ORDER — FAMOTIDINE 10 MG/ML
20 INJECTION INTRAVENOUS ONCE
Status: COMPLETED | OUTPATIENT
Start: 2019-09-09 | End: 2019-09-09

## 2019-09-09 RX ORDER — SODIUM CHLORIDE, SODIUM LACTATE, POTASSIUM CHLORIDE, CALCIUM CHLORIDE 600; 310; 30; 20 MG/100ML; MG/100ML; MG/100ML; MG/100ML
25 INJECTION, SOLUTION INTRAVENOUS CONTINUOUS
Status: DISCONTINUED | OUTPATIENT
Start: 2019-09-09 | End: 2019-09-09 | Stop reason: HOSPADM

## 2019-09-09 RX ORDER — HYDROMORPHONE HYDROCHLORIDE 1 MG/ML
1 INJECTION, SOLUTION INTRAMUSCULAR; INTRAVENOUS; SUBCUTANEOUS
Status: DISCONTINUED | OUTPATIENT
Start: 2019-09-09 | End: 2019-09-10 | Stop reason: HOSPADM

## 2019-09-09 RX ORDER — GLYCOPYRROLATE 0.6MG/3ML
SYRINGE (ML) INTRAVENOUS AS NEEDED
Status: DISCONTINUED | OUTPATIENT
Start: 2019-09-09 | End: 2019-09-09 | Stop reason: HOSPADM

## 2019-09-09 RX ORDER — SODIUM CHLORIDE, SODIUM LACTATE, POTASSIUM CHLORIDE, CALCIUM CHLORIDE 600; 310; 30; 20 MG/100ML; MG/100ML; MG/100ML; MG/100ML
150 INJECTION, SOLUTION INTRAVENOUS CONTINUOUS
Status: DISCONTINUED | OUTPATIENT
Start: 2019-09-09 | End: 2019-09-10 | Stop reason: HOSPADM

## 2019-09-09 RX ORDER — HYOSCYAMINE SULFATE 0.12 MG/1
0.12 TABLET SUBLINGUAL
Status: DISCONTINUED | OUTPATIENT
Start: 2019-09-09 | End: 2019-09-09

## 2019-09-09 RX ORDER — ACETAMINOPHEN 325 MG/1
650 TABLET ORAL EVERY 6 HOURS
Status: DISCONTINUED | OUTPATIENT
Start: 2019-09-09 | End: 2019-09-10 | Stop reason: HOSPADM

## 2019-09-09 RX ORDER — BUPIVACAINE HYDROCHLORIDE AND EPINEPHRINE 5; 5 MG/ML; UG/ML
INJECTION, SOLUTION EPIDURAL; INTRACAUDAL; PERINEURAL AS NEEDED
Status: DISCONTINUED | OUTPATIENT
Start: 2019-09-09 | End: 2019-09-09 | Stop reason: HOSPADM

## 2019-09-09 RX ORDER — NEOSTIGMINE METHYLSULFATE 1 MG/ML
INJECTION, SOLUTION INTRAVENOUS AS NEEDED
Status: DISCONTINUED | OUTPATIENT
Start: 2019-09-09 | End: 2019-09-09 | Stop reason: HOSPADM

## 2019-09-09 RX ORDER — HYOSCYAMINE SULFATE 0.12 MG/1
0.12 TABLET, ORALLY DISINTEGRATING ORAL
Status: DISCONTINUED | OUTPATIENT
Start: 2019-09-09 | End: 2019-09-10 | Stop reason: HOSPADM

## 2019-09-09 RX ORDER — FENTANYL CITRATE 50 UG/ML
50 INJECTION, SOLUTION INTRAMUSCULAR; INTRAVENOUS AS NEEDED
Status: DISCONTINUED | OUTPATIENT
Start: 2019-09-09 | End: 2019-09-09 | Stop reason: HOSPADM

## 2019-09-09 RX ORDER — DIPHENHYDRAMINE HYDROCHLORIDE 50 MG/ML
25 INJECTION, SOLUTION INTRAMUSCULAR; INTRAVENOUS
Status: DISCONTINUED | OUTPATIENT
Start: 2019-09-09 | End: 2019-09-10 | Stop reason: HOSPADM

## 2019-09-09 RX ORDER — HYDROMORPHONE HYDROCHLORIDE 2 MG/ML
0.5 INJECTION, SOLUTION INTRAMUSCULAR; INTRAVENOUS; SUBCUTANEOUS AS NEEDED
Status: DISCONTINUED | OUTPATIENT
Start: 2019-09-09 | End: 2019-09-09 | Stop reason: HOSPADM

## 2019-09-09 RX ORDER — SODIUM CHLORIDE, SODIUM LACTATE, POTASSIUM CHLORIDE, CALCIUM CHLORIDE 600; 310; 30; 20 MG/100ML; MG/100ML; MG/100ML; MG/100ML
100 INJECTION, SOLUTION INTRAVENOUS CONTINUOUS
Status: DISCONTINUED | OUTPATIENT
Start: 2019-09-09 | End: 2019-09-09 | Stop reason: HOSPADM

## 2019-09-09 RX ORDER — LIDOCAINE HYDROCHLORIDE 10 MG/ML
INJECTION INFILTRATION; PERINEURAL AS NEEDED
Status: DISCONTINUED | OUTPATIENT
Start: 2019-09-09 | End: 2019-09-09 | Stop reason: HOSPADM

## 2019-09-09 RX ORDER — LIDOCAINE HYDROCHLORIDE 20 MG/ML
INJECTION, SOLUTION EPIDURAL; INFILTRATION; INTRACAUDAL; PERINEURAL AS NEEDED
Status: DISCONTINUED | OUTPATIENT
Start: 2019-09-09 | End: 2019-09-09 | Stop reason: HOSPADM

## 2019-09-09 RX ORDER — ENOXAPARIN SODIUM 100 MG/ML
40 INJECTION SUBCUTANEOUS DAILY
Status: DISCONTINUED | OUTPATIENT
Start: 2019-09-10 | End: 2019-09-10 | Stop reason: HOSPADM

## 2019-09-09 RX ORDER — SODIUM CHLORIDE, SODIUM LACTATE, POTASSIUM CHLORIDE, CALCIUM CHLORIDE 600; 310; 30; 20 MG/100ML; MG/100ML; MG/100ML; MG/100ML
150 INJECTION, SOLUTION INTRAVENOUS CONTINUOUS
Status: DISCONTINUED | OUTPATIENT
Start: 2019-09-09 | End: 2019-09-09 | Stop reason: HOSPADM

## 2019-09-09 RX ORDER — OXYCODONE HYDROCHLORIDE 5 MG/1
5 TABLET ORAL
Status: DISCONTINUED | OUTPATIENT
Start: 2019-09-09 | End: 2019-09-10 | Stop reason: HOSPADM

## 2019-09-09 RX ORDER — FAMOTIDINE 20 MG/1
20 TABLET, FILM COATED ORAL ONCE
Status: DISCONTINUED | OUTPATIENT
Start: 2019-09-09 | End: 2019-09-09 | Stop reason: SDUPTHER

## 2019-09-09 RX ORDER — FAMOTIDINE 20 MG/1
20 TABLET, FILM COATED ORAL ONCE
Status: DISCONTINUED | OUTPATIENT
Start: 2019-09-09 | End: 2019-09-09

## 2019-09-09 RX ORDER — MIDAZOLAM HYDROCHLORIDE 1 MG/ML
INJECTION, SOLUTION INTRAMUSCULAR; INTRAVENOUS AS NEEDED
Status: DISCONTINUED | OUTPATIENT
Start: 2019-09-09 | End: 2019-09-09 | Stop reason: HOSPADM

## 2019-09-09 RX ORDER — ONDANSETRON 2 MG/ML
4 INJECTION INTRAMUSCULAR; INTRAVENOUS ONCE
Status: DISCONTINUED | OUTPATIENT
Start: 2019-09-09 | End: 2019-09-09 | Stop reason: HOSPADM

## 2019-09-09 RX ORDER — ONDANSETRON 2 MG/ML
4 INJECTION INTRAMUSCULAR; INTRAVENOUS
Status: DISCONTINUED | OUTPATIENT
Start: 2019-09-09 | End: 2019-09-10 | Stop reason: HOSPADM

## 2019-09-09 RX ORDER — PROPOFOL 10 MG/ML
INJECTION, EMULSION INTRAVENOUS AS NEEDED
Status: DISCONTINUED | OUTPATIENT
Start: 2019-09-09 | End: 2019-09-09 | Stop reason: HOSPADM

## 2019-09-09 RX ORDER — ENOXAPARIN SODIUM 100 MG/ML
40 INJECTION SUBCUTANEOUS ONCE
Status: COMPLETED | OUTPATIENT
Start: 2019-09-09 | End: 2019-09-09

## 2019-09-09 RX ORDER — ACETAMINOPHEN 650 MG/1
650 SUPPOSITORY RECTAL ONCE
Status: COMPLETED | OUTPATIENT
Start: 2019-09-09 | End: 2019-09-09

## 2019-09-09 RX ORDER — NALOXONE HYDROCHLORIDE 0.4 MG/ML
0.4 INJECTION, SOLUTION INTRAMUSCULAR; INTRAVENOUS; SUBCUTANEOUS AS NEEDED
Status: DISCONTINUED | OUTPATIENT
Start: 2019-09-09 | End: 2019-09-10 | Stop reason: HOSPADM

## 2019-09-09 RX ORDER — VECURONIUM BROMIDE FOR INJECTION 1 MG/ML
INJECTION, POWDER, LYOPHILIZED, FOR SOLUTION INTRAVENOUS AS NEEDED
Status: DISCONTINUED | OUTPATIENT
Start: 2019-09-09 | End: 2019-09-09 | Stop reason: HOSPADM

## 2019-09-09 RX ORDER — CEFAZOLIN SODIUM 2 G/50ML
2 SOLUTION INTRAVENOUS ONCE
Status: DISCONTINUED | OUTPATIENT
Start: 2019-09-09 | End: 2019-09-09 | Stop reason: HOSPADM

## 2019-09-09 RX ORDER — DEXAMETHASONE SODIUM PHOSPHATE 4 MG/ML
INJECTION, SOLUTION INTRA-ARTICULAR; INTRALESIONAL; INTRAMUSCULAR; INTRAVENOUS; SOFT TISSUE AS NEEDED
Status: DISCONTINUED | OUTPATIENT
Start: 2019-09-09 | End: 2019-09-09 | Stop reason: HOSPADM

## 2019-09-09 RX ORDER — ONDANSETRON HYDROCHLORIDE 4 MG/2ML
INJECTION, SOLUTION INTRAMUSCULAR; INTRAVENOUS AS NEEDED
Status: DISCONTINUED | OUTPATIENT
Start: 2019-09-09 | End: 2019-09-09 | Stop reason: HOSPADM

## 2019-09-09 RX ORDER — FENTANYL CITRATE 50 UG/ML
INJECTION, SOLUTION INTRAMUSCULAR; INTRAVENOUS AS NEEDED
Status: DISCONTINUED | OUTPATIENT
Start: 2019-09-09 | End: 2019-09-09 | Stop reason: HOSPADM

## 2019-09-09 RX ADMIN — NEOSTIGMINE METHYLSULFATE 3 MG: 1 INJECTION, SOLUTION INTRAVENOUS at 09:21

## 2019-09-09 RX ADMIN — FENTANYL CITRATE 50 MCG: 50 INJECTION, SOLUTION INTRAMUSCULAR; INTRAVENOUS at 10:38

## 2019-09-09 RX ADMIN — VECURONIUM BROMIDE FOR INJECTION 4 MG: 1 INJECTION, POWDER, LYOPHILIZED, FOR SOLUTION INTRAVENOUS at 08:02

## 2019-09-09 RX ADMIN — PROPOFOL 200 MG: 10 INJECTION, EMULSION INTRAVENOUS at 07:52

## 2019-09-09 RX ADMIN — ONDANSETRON HYDROCHLORIDE 4 MG: 4 INJECTION, SOLUTION INTRAMUSCULAR; INTRAVENOUS at 08:18

## 2019-09-09 RX ADMIN — Medication 100 MG: at 07:52

## 2019-09-09 RX ADMIN — SODIUM CHLORIDE, SODIUM LACTATE, POTASSIUM CHLORIDE, AND CALCIUM CHLORIDE 150 ML/HR: 600; 310; 30; 20 INJECTION, SOLUTION INTRAVENOUS at 15:05

## 2019-09-09 RX ADMIN — VECURONIUM BROMIDE FOR INJECTION 1 MG: 1 INJECTION, POWDER, LYOPHILIZED, FOR SOLUTION INTRAVENOUS at 07:52

## 2019-09-09 RX ADMIN — FAMOTIDINE 20 MG: 10 INJECTION, SOLUTION INTRAVENOUS at 06:51

## 2019-09-09 RX ADMIN — Medication 0.4 MG: at 09:21

## 2019-09-09 RX ADMIN — ENOXAPARIN SODIUM 40 MG: 40 INJECTION SUBCUTANEOUS at 06:59

## 2019-09-09 RX ADMIN — HYOSCYAMINE SULFATE 0.12 MG: 0.12 TABLET, ORALLY DISINTEGRATING ORAL at 10:51

## 2019-09-09 RX ADMIN — SODIUM CHLORIDE, SODIUM LACTATE, POTASSIUM CHLORIDE, AND CALCIUM CHLORIDE: 600; 310; 30; 20 INJECTION, SOLUTION INTRAVENOUS at 07:42

## 2019-09-09 RX ADMIN — ACETAMINOPHEN 650 MG: 325 TABLET, FILM COATED ORAL at 20:10

## 2019-09-09 RX ADMIN — HYDROMORPHONE HYDROCHLORIDE 0.5 MG: 1 INJECTION, SOLUTION INTRAMUSCULAR; INTRAVENOUS; SUBCUTANEOUS at 08:56

## 2019-09-09 RX ADMIN — VECURONIUM BROMIDE FOR INJECTION 0.5 MG: 1 INJECTION, POWDER, LYOPHILIZED, FOR SOLUTION INTRAVENOUS at 08:31

## 2019-09-09 RX ADMIN — LIDOCAINE HYDROCHLORIDE 80 MG: 20 INJECTION, SOLUTION EPIDURAL; INFILTRATION; INTRACAUDAL; PERINEURAL at 07:52

## 2019-09-09 RX ADMIN — MIDAZOLAM HYDROCHLORIDE 2 MG: 1 INJECTION, SOLUTION INTRAMUSCULAR; INTRAVENOUS at 07:45

## 2019-09-09 RX ADMIN — VECURONIUM BROMIDE FOR INJECTION 1 MG: 1 INJECTION, POWDER, LYOPHILIZED, FOR SOLUTION INTRAVENOUS at 08:56

## 2019-09-09 RX ADMIN — VECURONIUM BROMIDE FOR INJECTION 1 MG: 1 INJECTION, POWDER, LYOPHILIZED, FOR SOLUTION INTRAVENOUS at 09:32

## 2019-09-09 RX ADMIN — VECURONIUM BROMIDE FOR INJECTION 0.5 MG: 1 INJECTION, POWDER, LYOPHILIZED, FOR SOLUTION INTRAVENOUS at 08:39

## 2019-09-09 RX ADMIN — HYDROMORPHONE HYDROCHLORIDE 0.5 MG: 1 INJECTION, SOLUTION INTRAMUSCULAR; INTRAVENOUS; SUBCUTANEOUS at 08:41

## 2019-09-09 RX ADMIN — FENTANYL CITRATE 100 MCG: 50 INJECTION, SOLUTION INTRAMUSCULAR; INTRAVENOUS at 07:52

## 2019-09-09 RX ADMIN — VECURONIUM BROMIDE FOR INJECTION 1 MG: 1 INJECTION, POWDER, LYOPHILIZED, FOR SOLUTION INTRAVENOUS at 09:15

## 2019-09-09 RX ADMIN — ACETAMINOPHEN 650 MG: 325 TABLET, FILM COATED ORAL at 14:02

## 2019-09-09 RX ADMIN — DEXAMETHASONE SODIUM PHOSPHATE 4 MG: 4 INJECTION, SOLUTION INTRA-ARTICULAR; INTRALESIONAL; INTRAMUSCULAR; INTRAVENOUS; SOFT TISSUE at 08:18

## 2019-09-09 RX ADMIN — SODIUM CHLORIDE, SODIUM LACTATE, POTASSIUM CHLORIDE, AND CALCIUM CHLORIDE 150 ML/HR: 600; 310; 30; 20 INJECTION, SOLUTION INTRAVENOUS at 06:44

## 2019-09-09 RX ADMIN — SODIUM CHLORIDE, SODIUM LACTATE, POTASSIUM CHLORIDE, AND CALCIUM CHLORIDE 150 ML/HR: 600; 310; 30; 20 INJECTION, SOLUTION INTRAVENOUS at 22:49

## 2019-09-09 NOTE — PROGRESS NOTES
Assisted the patient up to walk and he has a steady gait. He has voided 325 cc urine. He is now sitting up in the chair with SCD's on.

## 2019-09-09 NOTE — PERIOP NOTES
Pre-Op Summary    Pt arrived via car with family/friend and is oriented to time, place, person and situation. Patient with steady gait with none assistive devices. Visit Vitals  /89   Pulse (!) 53   Temp 98.1 °F (36.7 °C)   Resp 18   Ht 5' 6\" (1.676 m)   SpO2 100%   BMI 39.54 kg/m²       Peripheral IV located on Left forearm. Patients belongings are located with wife Jacinto Jeter. Patient's point of contact is Edie Roberts  and their contact number is: [phone]. They will be in the waiting room. They are able to receive medication information. They will be admitted.

## 2019-09-09 NOTE — H&P
Suresh Arevalo,    General Surgery   Morbid obesity [facility]) +1 more   Dx   Weight Management     Reason for Visit    Progress Notes        Initial Consultation for Bariatric Surgery Template      Ramakrishna Parkinson is a 52 y.o. [place] male initially evaluated for consideration of bariatric surgery at [facility] last year. The patient initiated the process there which she completed and his now opted to pursue further care at [address] [place] [address]. Onset obesity: Age 39 weight 190 pounds on a 5 foot 4 inch frame. Weight at age 25: 160 pounds on a five-point frame. Maximum weight: 268 pounds in 2017. Current weight: 246 pounds on a 5 foot 4 inch frame with a body mass index of 40. Pattern weight gain: Slowly progressive interrupted by dietary weight loss followed by regaining the last weight as well as additional weight that is exhibiting the yo-yo effect up to his maximum weight of 268 pounds in 2017. Maximum medical weight loss attempts: Multiple unsupervised and supervised weight loss trials with a maximum loss occurring in 2017 losing 20 pounds over 6 months. Comorbidities: Hypertension, prediabetes, hypertriglyceridemia, hypercholesterolemia, gastroesophageal reflux disease, CPAP dependent obstructive sleep apnea, reactive airway disease, weight related arthropathy-back. Current weight: 246. BMI: 40. Ideal body weight: 145. Excess body weight: 101. Estimated weight loss: 81. Goal weight: 165. Allergies: No known drug allergies. Current medications: See medication list.  Past medical history:  1. Clinically severe obesity with body mass index of 40 and obesity related comorbidities hypertension, prediabetes, hypertriglyceridemia, hypercholesterolemia, gastroesophageal reflux disease, reactive airway disease, CPAP and obstructive sleep apnea, weight related arthropathy-back. 2.  Anxiety/depression. 3.  Depression. Past surgical history: None.   Social history:  Alcohol: None.  Tobacco: 1/2 pack of cigarettes daily for 10-year. Quitting in 1991. Family history: Mother 73-healthy. Father 79-prostate carcinoma, reactive airway disease. Siblings x5-healthy          Past Medical History:   Diagnosis Date    Hyperlipidemia      Hypertension      Ill-defined condition       elevated cholesterol    Psychiatric disorder       anxiety with depression    Sleep apnea       uses sleep apnea               Past Surgical History:   Procedure Laterality Date    COLONOSCOPY N/A 2/1/2018     COLONOSCOPY, DIAGNOSTIC with polypectomy  performed by Taylor Connor MD at [facility] ENDOSCOPY                Current Outpatient Medications   Medication Sig Dispense Refill    amLODIPine (NORVASC) 5 mg tablet amlodipine 5 mg tablet        atorvastatin (LIPITOR) 40 mg tablet Take 40 mg by mouth two (2) times a day.        ezetimibe (ZETIA) 10 mg tablet Take 10 mg by mouth daily.        sertraline (ZOLOFT) 100 mg tablet Take 200 mg by mouth nightly.        methocarbamol (ROBAXIN-750) 750 mg tablet Take 1 Tab by mouth four (4) times daily. 16 Tab 0    albuterol sulfate 90 mcg/actuation aepb Take 2 Puffs by inhalation every four (4) hours as needed. 1 Inhaler 0         No Known Allergies     Social History           Tobacco Use    Smoking status: Former Smoker    Smokeless tobacco: Never Used   Substance Use Topics    Alcohol use: No    Drug use:  No               Family History   Problem Relation Age of Onset    Asthma Mother      Cancer Mother      Cancer Father                 Family Status   Relation Name Status    Mother   Alive    Father   Alive         Review of Systems:  Positive in BOLD     CONST: Fever, weight loss, fatigue or chills  GI: Nausea, vomiting, abdominal pain, change in bowel habits, hematochezia, melena, and GERD   INTEG: Dermatitis, abnormal moles  HEENT: Recent changes in vision, vertigo, epistaxis, dysphagia and hoarseness  CV: Chest pain, palpitations, HTN, edema and varicosities  RESP: Cough, shortness of breath, wheezing, hemoptysis, snoring and reactive airway disease  : Hematuria, dysuria, frequency, urgency, nocturia and stress urinary incontinence   MS: Weakness, joint pain and arthritis  ENDO: Diabetes, thyroid disease, polyuria, polydipsia, polyphagia, poor wound healing, heat intolerance, cold intolerance  LYMPH/HEME: Anemia, bruising and history of blood transfusions  NEURO: Dizziness, headache, fainting, seizures and stroke  PSYCH: Anxiety and depression        Physical Exam     Visit Vitals  /83 (BP 1 Location: Right arm, BP Patient Position: Sitting)   Pulse 62   Temp 97.8 °F (36.6 °C) (Oral)   Ht 5' 6\" (1.676 m)   Wt 111.7 kg (246 lb 3.2 oz)   SpO2 99%   BMI 39.74 kg/m²                   General: 52 y.o.) male in no acute distress. Who is morbidly obese  HEENT: Normocephalic, atraumatic, Pupils equal and reactive, nasopharynx clear, oropharynx clear and moist without lesions  NECK: Supple, no lymphadenopathy, thyromegaly, carotid bruits or jugular venous distension. trachea midline  RESP: Clear to auscultation bilaterally, no wheezes, rhonchi, or rales, normal respiratory excursion  CV: Regular rate and rhythm, no murmurs, rubs or gallops. 3+/4 pulses in bilateral dorsalis pedis and posterior tibialis. No distal edema or varicosities.   ABD: Soft, nontender, nondistended, normoactive bowel sounds, no hernias, no hepatosplenomegaly,  Extremities: Warm, well perfused, no tenderness or swelling, normal gait/station  Neuro: Sensation and strength grossly intact and symmetrical  Psych: Alert and oriented to person, place, and time.     Laboratory, radiologic, nutrition and psychology consultations have been completed, we are awaiting documentation  Impression:     Anthony Pitt is a 52 y.o. male with clinically severe obesity and a body mass index of 40 with obesity related comorbidities of hypertension, hypercholesterolemia, hypertriglyceridemia, prediabetes, gastroesophageal reflux disease, reactive airway disease, CPAP dependent obstructive sleep apnea, weight related arthropathy and hepatic steatosis    who would benefit from bariatric surgery. We have had an extensive discussion with regard to the risks, benefits and likely outcomes of the operation. We've discussed the restrictive and malabsorptive nature of the gastric bypass and compared and contrasted with the sleeve gastrectomy. The patient understands the likelihood of losing approximately 80% of their excess weight in 12 to 18 months. He also understands the risks including but not limited to bleeding, infection, need for reoperation, ulcers, leaks and strictures, bowel obstruction secondary to adhesions and internal hernias, DVT, PE, heart attack, stroke, and death. Patient also understands risks of inadequate weight loss, excess weight loss, vitamin insufficiency, protein malnutrition, excess skin, and loss of hair. We have reviewed the components of a successful postoperative course including requirement for a high protein, low carbohydrate diet, 60 oz a day of zero calorie liquids, daily vitamin supplementation, daily exercise, regular follow-up, and participation in support groups.  At this time we will enroll the patient in our bariatric program, obtain the previously completed laboratory evaluation, chest X-ray, EKG,  and evaluation by  nutritionist as well as psychologist.  Patient has elected after completing his multidisciplinary evaluation to pursue lap scopic potentially open Say-en-Y gastric bypass to achieve definitive durable weight loss on a personal level with expected resolution of obesity related comorbidities      No change from initial [facility] SHERIE Lin  9/9/2019

## 2019-09-09 NOTE — ANESTHESIA POSTPROCEDURE EVALUATION
Procedure(s):  LAPAROSCOPIC marybeth-en-y GASTRIC BYPASS. general    Anesthesia Post Evaluation      Multimodal analgesia: multimodal analgesia used between 6 hours prior to anesthesia start to PACU discharge  Patient location during evaluation: bedside  Patient participation: complete - patient participated  Level of consciousness: awake and alert  Pain management: adequate  Airway patency: patent  Anesthetic complications: no  Cardiovascular status: hemodynamically stable  Respiratory status: acceptable  Hydration status: acceptable  Post anesthesia nausea and vomiting:  none      Vitals Value Taken Time   /80 9/9/2019 11:19 AM   Temp 36.2 °C (97.1 °F) 9/9/2019 10:20 AM   Pulse 56 9/9/2019 11:22 AM   Resp 8 9/9/2019 11:22 AM   SpO2 100 % 9/9/2019 11:22 AM   Vitals shown include unvalidated device data.

## 2019-09-09 NOTE — ROUTINE PROCESS
Bedside and Verbal shift change report given to Delmy Zhou   (oncoming nurse) by Miryam Ureña RN   (offgoing nurse). Report included the following information SBAR, Kardex and Intake/Output.

## 2019-09-09 NOTE — PROGRESS NOTES
1340  Received from PACU, alert and oriented, moving all extremities, no skin breakdown, double check with JOHN Mandujano, blake encourage, told him about  Ambulation, and 2 oz every hour. 1500  Offered pain med but does not want yet, walk 3 x already in the hallway trying to pass gas, blake redman. 1730  Ambulated 4x in the hallway, offered pain med but refused, he wants to pass gas, informed only  Walking  Makes him pass gas. 1800  Offered pain med but refusing at this time, taking ice chips no nausea.

## 2019-09-09 NOTE — PROGRESS NOTES
Problem: Discharge Planning  Goal: *Discharge to safe environment  Outcome: Progressing Towards Goal  Plan is home    Reason for Admission:   Morbid obesity                   RRAT Score:          10           Plan for utilizing home health:      no                    Current Advanced Directive/Advance Care Plan: no                         Transition of Care Plan:   Patient  lives with his wife, he is independent with ADL . He uses no DME. He has family and friends to support him. His plan is Home. Patient has designated _spouse_______________________ to participate in his/her discharge plan and to receive any needed information. Name: Rosina Kebede  Address:  Phone number:       [phone]    Care Management Interventions  PCP Verified by CM: Yes  Palliative Care Criteria Met (RRAT>21 & CHF Dx)?: No  Mode of Transport at Discharge: Other (see comment)(wife)  Transition of Care Consult (CM Consult): Discharge Planning  MyChart Signup: No  Discharge Durable Medical Equipment: No  Physical Therapy Consult: No  Occupational Therapy Consult: No  Speech Therapy Consult: No  Current Support Network: Lives with Spouse  Confirm Follow Up Transport: Family  Plan discussed with Pt/Family/Caregiver: Yes  Discharge Location  Discharge Placement: Home           OC KingsleyN  [facility]  [phone], Pager [phone]  [email or web address]

## 2019-09-09 NOTE — BRIEF OP NOTE
BRIEF OPERATIVE NOTE    Date of Procedure: 9/9/2019   Preoperative Diagnosis: e66.01  Postoperative Diagnosis: e66.01    Procedure(s):  LAPAROSCOPIC marybeth-en-y GASTRIC BYPASS  Surgeon(s) and Role:     DO Jesscia Barnhart         Surgical Assistant: none    Surgical Staff:  Circ-1: Susan Coronado  Scrub Tech-1: Vanesa Ching  Scrub Tech-2: Michaela Garcia  Surg Asst-1: Bette ARTEAGA  Surg Asst-2: Carvel Field  Event Time In Time Out   Incision Start 1558    Incision Close 1010      Anesthesia: General   Estimated Blood Loss: 25  Specimens: * No specimens in log *   Findings: NIURKA   Complications: none  Implants: * No implants in log *

## 2019-09-09 NOTE — PROGRESS NOTES
conducted a pre-surgery visit with Ramakrishna Parkinson, who is a 52 y.o.,male. The  provided the following Interventions:  Initiated a relationship of care and support. Offered prayer and assurance of continued prayers on patient's behalf. Plan:  Chaplains will continue to follow and will provide pastoral care on an as needed/requested basis.  recommends bedside caregivers page  on duty if patient shows signs of acute spiritual or emotional distress.     [facility], [facility]   Spiritual Care   [phone]

## 2019-09-09 NOTE — PERIOP NOTES
1020 Patient arrived to PACU. Assumed care. Connected to monitor. VSS. Will continue to monitor    1045 updated patients wife    [phone] report given to [facility]. No changes since patient in overflow assessment. No complaints of pain. Family updated throughout process. TRANSFER - OUT REPORT:    Verbal report given to Bety Pipo (name) on Luis Rose  being transferred to  (unit) for routine post - op       Report consisted of patients Situation, Background, Assessment and   Recommendations(SBAR). Information from the following report(s) SBAR, Procedure Summary, Med Rec Status and Cardiac Rhythm NSR was reviewed with the receiving nurse. Lines:   Peripheral IV 09/09/19 Left Forearm (Active)   Site Assessment Clean, dry, & intact 9/9/2019 11:19 AM   Phlebitis Assessment 0 9/9/2019 11:19 AM   Infiltration Assessment 0 9/9/2019 11:19 AM   Dressing Status Clean, dry, & intact 9/9/2019 11:19 AM   Dressing Type Transparent 9/9/2019 11:19 AM   Hub Color/Line Status Pink;Flushed; Infusing 9/9/2019 11:19 AM   Action Taken Open ports on tubing capped 9/9/2019  6:43 AM   Alcohol Cap Used Yes 9/9/2019  6:43 AM        Opportunity for questions and clarification was provided.       Patient transported with:   Cittadino

## 2019-09-09 NOTE — ANESTHESIA PREPROCEDURE EVALUATION
Relevant Problems   No relevant active problems       Anesthetic History   No history of anesthetic complications            Review of Systems / Medical History  Patient summary reviewed, nursing notes reviewed and pertinent labs reviewed    Pulmonary        Sleep apnea: CPAP           Neuro/Psych         Psychiatric history     Cardiovascular    Hypertension                   GI/Hepatic/Renal  Within defined limits              Endo/Other        Morbid obesity     Other Findings              Physical Exam    Airway  Mallampati: II  TM Distance: 4 - 6 cm  Neck ROM: normal range of motion   Mouth opening: Normal     Cardiovascular  Regular rate and rhythm,  S1 and S2 normal,  no murmur, click, rub, or gallop             Dental    Dentition: Poor dentition     Pulmonary  Breath sounds clear to auscultation               Abdominal  GI exam deferred       Other Findings            Anesthetic Plan    ASA: 3  Anesthesia type: general          Induction: Intravenous  Anesthetic plan and risks discussed with: Patient

## 2019-09-10 VITALS
TEMPERATURE: 98.7 F | HEIGHT: 66 IN | HEART RATE: 76 BPM | RESPIRATION RATE: 17 BRPM | OXYGEN SATURATION: 98 % | DIASTOLIC BLOOD PRESSURE: 71 MMHG | SYSTOLIC BLOOD PRESSURE: 156 MMHG | BODY MASS INDEX: 39.54 KG/M2

## 2019-09-10 LAB
ANION GAP SERPL CALC-SCNC: 0 MMOL/L (ref 3–18)
BUN SERPL-MCNC: 12 MG/DL (ref 7–18)
BUN/CREAT SERPL: 11 (ref 12–20)
CALCIUM SERPL-MCNC: 9.1 MG/DL (ref 8.5–10.1)
CHLORIDE SERPL-SCNC: 103 MMOL/L (ref 100–111)
CO2 SERPL-SCNC: 35 MMOL/L (ref 21–32)
CREAT SERPL-MCNC: 1.11 MG/DL (ref 0.6–1.3)
ERYTHROCYTE [DISTWIDTH] IN BLOOD BY AUTOMATED COUNT: 12.8 % (ref 11.6–14.5)
GLUCOSE SERPL-MCNC: 113 MG/DL (ref 74–99)
HCT VFR BLD AUTO: 44.2 % (ref 36–48)
HGB BLD-MCNC: 15 G/DL (ref 13–16)
MCH RBC QN AUTO: 30.5 PG (ref 24–34)
MCHC RBC AUTO-ENTMCNC: 33.9 G/DL (ref 31–37)
MCV RBC AUTO: 90 FL (ref 74–97)
PLATELET # BLD AUTO: 268 K/UL (ref 135–420)
PMV BLD AUTO: 11.4 FL (ref 9.2–11.8)
POTASSIUM SERPL-SCNC: 4.1 MMOL/L (ref 3.5–5.5)
RBC # BLD AUTO: 4.91 M/UL (ref 4.7–5.5)
SODIUM SERPL-SCNC: 138 MMOL/L (ref 136–145)
WBC # BLD AUTO: 9.3 K/UL (ref 4.6–13.2)

## 2019-09-10 PROCEDURE — 80048 BASIC METABOLIC PNL TOTAL CA: CPT

## 2019-09-10 PROCEDURE — 36415 COLL VENOUS BLD VENIPUNCTURE: CPT

## 2019-09-10 PROCEDURE — 74011000250 HC RX REV CODE- 250: Performed by: SURGERY

## 2019-09-10 PROCEDURE — 74011250637 HC RX REV CODE- 250/637: Performed by: SURGERY

## 2019-09-10 PROCEDURE — C9113 INJ PANTOPRAZOLE SODIUM, VIA: HCPCS | Performed by: SURGERY

## 2019-09-10 PROCEDURE — 85027 COMPLETE CBC AUTOMATED: CPT

## 2019-09-10 PROCEDURE — 74011250636 HC RX REV CODE- 250/636: Performed by: SURGERY

## 2019-09-10 RX ORDER — HYOSCYAMINE SULFATE 0.12 MG/1
0.12 TABLET, ORALLY DISINTEGRATING ORAL
Qty: 15 TAB | Refills: 0 | Status: SHIPPED | OUTPATIENT
Start: 2019-09-10 | End: 2021-11-11

## 2019-09-10 RX ORDER — URSODIOL 300 MG/1
300 CAPSULE ORAL 2 TIMES DAILY
Qty: 120 CAP | Refills: 2 | Status: SHIPPED | OUTPATIENT
Start: 2019-09-10 | End: 2021-11-11

## 2019-09-10 RX ORDER — HYOSCYAMINE SULFATE 0.12 MG/1
0.12 TABLET, ORALLY DISINTEGRATING ORAL
Qty: 15 TAB | Refills: 0 | Status: SHIPPED | OUTPATIENT
Start: 2019-09-10 | End: 2019-09-10

## 2019-09-10 RX ORDER — ENOXAPARIN SODIUM 100 MG/ML
40 INJECTION SUBCUTANEOUS DAILY
Qty: 7 SYRINGE | Refills: 0 | Status: SHIPPED
Start: 2019-09-10 | End: 2021-11-11

## 2019-09-10 RX ORDER — OXYCODONE HYDROCHLORIDE 5 MG/1
5 TABLET ORAL
Qty: 17 TAB | Refills: 0 | Status: SHIPPED | OUTPATIENT
Start: 2019-09-10 | End: 2019-09-13

## 2019-09-10 RX ORDER — OXYCODONE HYDROCHLORIDE 5 MG/1
5 TABLET ORAL
Qty: 17 TAB | Refills: 0 | Status: SHIPPED | OUTPATIENT
Start: 2019-09-10 | End: 2019-09-10

## 2019-09-10 RX ADMIN — ACETAMINOPHEN 650 MG: 325 TABLET, FILM COATED ORAL at 14:04

## 2019-09-10 RX ADMIN — SODIUM CHLORIDE 40 MG: 9 INJECTION, SOLUTION INTRAMUSCULAR; INTRAVENOUS; SUBCUTANEOUS at 09:37

## 2019-09-10 RX ADMIN — ENOXAPARIN SODIUM 40 MG: 40 INJECTION SUBCUTANEOUS at 09:36

## 2019-09-10 RX ADMIN — ACETAMINOPHEN 650 MG: 325 TABLET, FILM COATED ORAL at 09:35

## 2019-09-10 RX ADMIN — ACETAMINOPHEN 650 MG: 325 TABLET, FILM COATED ORAL at 01:58

## 2019-09-10 RX ADMIN — SODIUM CHLORIDE, SODIUM LACTATE, POTASSIUM CHLORIDE, AND CALCIUM CHLORIDE 150 ML/HR: 600; 310; 30; 20 INJECTION, SOLUTION INTRAVENOUS at 06:10

## 2019-09-10 NOTE — DISCHARGE INSTRUCTIONS
PLEASE REFER TO YOUR BARIATRIC BINDER FOR ALL DISCHARGE INSTRUCTIONS. PLEASE CALL DR DONALD OFFICE FOR ANY QUESTIONS AND/OR CONCERNS       DISCHARGE SUMMARY from Nurse    PATIENT INSTRUCTIONS:    After general anesthesia or intravenous sedation, for 24 hours or while taking prescription Narcotics:  · Limit your activities  · Do not drive and operate hazardous machinery  · Do not make important personal or business decisions  · Do  not drink alcoholic beverages  · If you have not urinated within 8 hours after discharge, please contact your surgeon on call. Report the following to your surgeon:  · Excessive pain, swelling, redness or odor of or around the surgical area  · Temperature over 100.5  · Nausea and vomiting lasting longer than 4 hours or if unable to take medications  · Any signs of decreased circulation or nerve impairment to extremity: change in color, persistent  numbness, tingling, coldness or increase pain  · Any questions    What to do at Home:  Recommended activity: Activity as tolerated, ***        *  Please give a list of your current medications to your Primary Care Provider. *  Please update this list whenever your medications are discontinued, doses are      changed, or new medications (including over-the-counter products) are added. *  Please carry medication information at all times in case of emergency situations. These are general instructions for a healthy lifestyle:    No smoking/ No tobacco products/ Avoid exposure to second hand smoke  Surgeon General's Warning:  Quitting smoking now greatly reduces serious risk to your health.     Obesity, smoking, and sedentary lifestyle greatly increases your risk for illness    A healthy diet, regular physical exercise & weight monitoring are important for maintaining a healthy lifestyle    You may be retaining fluid if you have a history of heart failure or if you experience any of the following symptoms:  Weight gain of 3 pounds or more overnight or 5 pounds in a week, increased swelling in our hands or feet or shortness of breath while lying flat in bed. Please call your doctor as soon as you notice any of these symptoms; do not wait until your next office visit. The discharge information has been reviewed with the {PATIENT PARENT GUARDIAN:[id number]}. The {PATIENT PARENT GUARDIAN:31597} verbalized understanding. Discharge medications reviewed with the {Dishcarishan meds reviewed UAY} and appropriate educational materials and side effects teaching were provided.   ___________________________________________________________________________________________________________________________________

## 2019-09-10 NOTE — PROGRESS NOTES
Problem: Falls - Risk of  Goal: *Absence of Falls  Description  Document Lesa Bearden Fall Risk and appropriate interventions in the flowsheet.   Outcome: Progressing Towards Goal  Note:   Fall Risk Interventions:            Medication Interventions: Patient to call before getting OOB, Teach patient to arise slowly    Elimination Interventions: Call light in reach, Patient to call for help with toileting needs              Problem: Patient Education: Go to Patient Education Activity  Goal: Patient/Family Education  Outcome: Progressing Towards Goal     Problem: Pain  Goal: *Control of Pain  Outcome: Progressing Towards Goal     Problem: Patient Education: Go to Patient Education Activity  Goal: Patient/Family Education  Outcome: Progressing Towards Goal     Problem: Patient Education: Go to Patient Education Activity  Goal: Patient/Family Education  Outcome: Progressing Towards Goal     Problem: Laparoscopic Gastric Bypass:Day of Surgery  Goal: Off Pathway (Use only if patient is Off Pathway)  Outcome: Progressing Towards Goal  Goal: Activity/Safety  Outcome: Progressing Towards Goal  Goal: Consults, if ordered  Outcome: Progressing Towards Goal  Goal: Diagnostic Test/Procedures  Outcome: Progressing Towards Goal  Goal: Nutrition/Diet  Outcome: Progressing Towards Goal  Goal: Medications  Outcome: Progressing Towards Goal  Goal: Respiratory  Outcome: Progressing Towards Goal  Goal: Treatments/Interventions/Procedures  Outcome: Progressing Towards Goal  Goal: Psychosocial  Outcome: Progressing Towards Goal  Goal: *No signs and symptoms of infection or wound complications  Outcome: Progressing Towards Goal  Goal: *Optimal pain control at patient's stated goal  Outcome: Progressing Towards Goal  Goal: *Adequate urinary output (equal to or greater than 30 milliliters/hour)  Outcome: Progressing Towards Goal  Goal: *Hemodynamically stable  Outcome: Progressing Towards Goal  Goal: *Tolerating diet  Outcome: Progressing Towards Goal  Goal: *Demonstrates progressive activity  Outcome: Progressing Towards Goal  Goal: *Absence of signs and symptoms of DVT  Outcome: Progressing Towards Goal  Goal: *Labs within defined limits  Outcome: Progressing Towards Goal  Goal: *Oxygen saturation within defined limits  Outcome: Progressing Towards Goal     Problem: Laparoscopic Gastric Bypass:Post-Op Day 1  Goal: Off Pathway (Use only if patient is Off Pathway)  Outcome: Progressing Towards Goal  Goal: Activity/Safety  Outcome: Progressing Towards Goal  Goal: Diagnostic Test/Procedures  Outcome: Progressing Towards Goal  Goal: Nutrition/Diet  Outcome: Progressing Towards Goal  Goal: Discharge Planning  Outcome: Progressing Towards Goal  Goal: Medications  Outcome: Progressing Towards Goal  Goal: Respiratory  Outcome: Progressing Towards Goal  Goal: Treatments/Interventions/Procedures  Outcome: Progressing Towards Goal  Goal: Psychosocial  Outcome: Progressing Towards Goal  Goal: *No signs and symptoms of infection or wound complications  Outcome: Progressing Towards Goal  Goal: *Optimal pain control at patient's stated goal  Outcome: Progressing Towards Goal  Goal: *Adequate urinary output (equal to or greater than 30 milliliters/hour)  Outcome: Progressing Towards Goal  Goal: *Hemodynamically stable  Outcome: Progressing Towards Goal  Goal: *Tolerating diet  Outcome: Progressing Towards Goal  Goal: *Demonstrates progressive activity  Outcome: Progressing Towards Goal  Goal: *Absence of signs and symptoms of DVT  Outcome: Progressing Towards Goal  Goal: *Labs within defined limits  Outcome: Progressing Towards Goal  Goal: *Oxygen saturation within defined limits  Outcome: Progressing Towards Goal  Goal: *Upper GI series/No leak identified  Outcome: Progressing Towards Goal     Problem: Laparoscopic Gastric Bypass:Post-Op Day 2  Goal: Off Pathway (Use only if patient is Off Pathway)  Outcome: Progressing Towards Goal  Goal: Activity/Safety  Outcome: Progressing Towards Goal  Goal: Diagnostic Test/Procedures  Outcome: Progressing Towards Goal  Goal: Nutrition/Diet  Outcome: Progressing Towards Goal  Goal: Discharge Planning  Outcome: Progressing Towards Goal  Goal: Medications  Outcome: Progressing Towards Goal  Goal: Respiratory  Outcome: Progressing Towards Goal  Goal: Treatments/Interventions/Procedures  Outcome: Progressing Towards Goal  Goal: Psychosocial  Outcome: Progressing Towards Goal     Problem: Laparoscopic Gastric Bypass:Discharge Outcomes  Goal: *Active bowel sounds  Outcome: Progressing Towards Goal  Goal: *Absence of signs and symptoms of DVT  Outcome: Progressing Towards Goal  Goal: *Hemodynamically stable  Outcome: Progressing Towards Goal  Goal: *Lungs clear or at baseline  Outcome: Progressing Towards Goal  Goal: *Demonstrates independent activity or return to baseline  Outcome: Progressing Towards Goal  Goal: *Optimal pain control at patient's stated goal  Outcome: Progressing Towards Goal  Goal: *Verbalizes understanding and describes prescribed diet  Outcome: Progressing Towards Goal  Goal: *Tolerating diet  Outcome: Progressing Towards Goal  Goal: *Verbalizes name, dosage, time, side effects, and number of days to continue medications  Outcome: Progressing Towards Goal  Goal: *No signs and symptoms of infection or wound complications  Outcome: Progressing Towards Goal  Goal: *Anxiety reduced or absent  Outcome: Progressing Towards Goal  Goal: *Understands and describes signs and symptoms to report to providers (eg: s/s of leak, DVT; inability to tolerate diet)  Outcome: Progressing Towards Goal  Goal: *Describes follow-up/return visits to physicians  Outcome: Progressing Towards Goal  Goal: *Describes available resources and support systems  Outcome: Progressing Towards Goal     Problem: Infection - Risk of, Surgical Site Infection  Goal: *Absence of surgical site infection signs and symptoms  Outcome: Progressing Towards Goal Problem: Discharge Planning  Goal: *Discharge to safe environment  Outcome: Progressing Towards Goal

## 2019-09-10 NOTE — PROGRESS NOTES
Bedside shift change report given to Bill Aqq. 291 (oncoming nurse) by Marcy Eduardo (offgoing nurse). Report included the following information SBAR, Kardex, Intake/Output and MAR.

## 2019-09-10 NOTE — ROUTINE PROCESS
I have reviewed discharge instructions with the patient. The patient verbalized understanding.   Patient waiting on [facility] for additional medication to be delivered

## 2019-09-10 NOTE — PROGRESS NOTES
Patient received in bed awake. A&Ox4, denies pain and discomfort. No distress noted. Frequently use items within reach. Bed locked in low position. Call bell within reach and Patient verbalized understanding of use for assistance and needs. 5059- Patient given Lovenox sq teaching. Patient demonstrated and able to perform proper technique of sq injection. Wife at bedside and call bell w/in reach. 1411- Pt discharge home; no distress noted, accompanied by his wife and Maninder Troy RN (Discharge/ Admission nurse) via of w/c to front entrance to car. Maninder Troy RN (Discharge/ Admission nurse) reviewed discharge instructions with Patient.

## 2019-09-10 NOTE — OP NOTES
[facility]  OPERATIVE REPORT    Name:  Vickie Matthew  MR#:   [id number]  :  1970  ACCOUNT #:  [de-identified]  DATE OF SERVICE:  2019    PREOPERATIVE DIAGNOSES:  Clinically severe obesity with a body mass index of 40 and obesity-related comorbidities of hypertension, prediabetes, hypertriglyceridemia, hypercholesterolemia, gastroesophageal reflux disease, reactive airway disease, CPAP-dependent obstructive sleep apnea, and weight-related arthropathy of his back. POSTOPERATIVE DIAGNOSES:  Clinically severe obesity with a body mass index of 40 and obesity-related comorbidities consisting of hypertension, prediabetes, hypertriglyceridemia, hypercholesterolemia, gastroesophageal reflux disease, reactive airway disease, CPAP-dependent obstructive sleep apnea, and weight-related arthropathy of his back. PROCEDURE PERFORMED:  Laparoscopic Say-en-Y gastric bypass utilizing a 15 mL separate tubularized gastric pouch placed on the lesser curvature of the stomach, a 091 cm retrocolic retrogastric Say limb, and a 40 cm biliopancreatic limb. SURGEON:  Noy Pedro. DO Ovidio    ASSISTANT: None. ANESTHESIA:  General endotracheal supplemented at the conclusion of the operative procedures with local infiltration of the operative sites with an equal mixture of 1% lidocaine plain mixed in equal portions of 0.5% Marcaine diluted in 1:200,000 epinephrine utilizing a total volume of 50 mL. COMPLICATIONS: None. SPECIMENS REMOVED:  None. IMPLANTS: None. ESTIMATED BLOOD LOSS:  25 mL. FINDINGS:  Normal intraabdominal anatomy. INDICATIONS FOR SURGICAL PROCEDURE:  This is a 66-year-old [place] male with body mass index of 39 and obesity related comorbidities of hypertension, prediabetes, hypertriglyceridemia, hypercholesterolemia, gastroesophageal reflux disease, reactive airway disease, CPAP-dependent obstructive sleep apnea, as well as weight-related arthropathy of his back.   After investigation of the surgical options for definitive weight loss, he elected to pursue laparoscopic potentially open Say-en-Y gastric bypass to achieve definitive weight loss on a personal level with expected resolution of obesity-related comorbidities. The risks, benefits of operative versus nonoperative and potential alternatives and potential complications up to and including death were extensively discussed with the patient prior to surgical procedure, who voiced his understanding and wished to proceed. PROCEDURE:  The patient received Ancef for antibiotic prophylaxis, Lovenox for DVT prophylaxis in the preoperative staging area. After signing his operative permit which was properly witnessed, he was transported to the operating room where he was placed in the supine position on the operating room table where SCDs were placed and inflated prior to the induction of general endotracheal anesthesia. A 16 Egyptian Joya catheter was then placed atraumatically to gravity drainage as was a 29 Egyptian orogastric tube for gastric decompression. The abdomen was prepped and draped in usual sterile fashion. A time-out procedure was performed according to protocol and agreed upon by all personnel in the operating suite. A transverse 12 mm cutaneous incision was made just superior to the umbilicus in the midline through which a 12 mm Optiview trocar and cannula were placed in the peritoneal cavity under direct vision utilizing a 10 mm 30 degree laparoscope. The laparoscope and trocar were removed. The abdomen was insufflated with carbon dioxide gas never exceeding a pressure of 15 mmHg and the remaining ports were then placed. The second was placed in the left anterior axillary line 2 fingerbreadths below the left costal margin through a 5 mm transverse incision utilizing a 5 mm Optiview trocar and cannula.   The third was placed midway between the left trocar and the supraumbilical port, was 12 mm in length through which a 12 mm Optiview trocar and cannula was placed in the peritoneal cavity under direct vision and the fourth in the right paramedian location, 8 cm from the midline, midway between the costal margin at the level of the umbilicus through which a 12 mm Optiview trocar and cannula were placed through a 12 mm cutaneous incision. After inspecting the upper abdomen, it was notable for normal appearing gallbladder and otherwise normal anatomy. The omentum and transverse colon were reflected superiorly. The ligament of Treitz was identified, and 40 cm distal to the ligament of Treitz, the jejunum was transected utilizing a triple load stapling device, 60 mm in length loaded with 2.5 mm staples. The mesentery was then divided with the Harmonic scalpel down to its Say. The Say limb was then measured to be 100 cm in length. At this point in time, on the antimesenteric border, enterotomy was created with the Harmonic scalpel. A similar antimesenteric enterotomy was created 2 cm proximal to staple line at the biliopancreatic limb and a triple-load stapling device, 60 mm in length, loaded with 2.5 mm staples, was introduced in the peritoneal cavity and subsequently each of the stapling device was introduced into each of the respective limbs, oriented on their antimesenteric borders prior to firing the staple. The remaining enteric defect was then closed with a running full-thickness 3-0 Vicryl suture. The mesenteric defect was closed with a running 2-0 silk suture. The ligament of Treitz was re-identified, and just anterior to the ligament of Treitz, a fenestration was created through the mesocolon and the lesser sac utilizing the Harmonic scalpel, and the Say limb was then placed through this incision in the lesser sac. The omentum and transverse colon were reflected back to normal anatomic positions.   The felicita was identified along the lesser curvature of the stomach, and just inferior to the felicita, along the greater curvature of the stomach, the omentum was  from the gastric wall utilizing the Harmonic scalpel until the Say limb was visualized within the lesser sac. A transverse 5 mm cutaneous incision was made 1 fingerbreadth distance from xiphoid junction to the umbilicus in the midline to through which a trocar was placed in the peritoneal cavity under direct vision. A 5 mm laparoscopic grasping instrument was introduced through a 5 mm cutaneous incision utilizing in conjunction with the self-retaining retractor to be able to provide hiatal exposure. The peritoneum overlying the angle of His  was then opened with a harmonic scalpel 5 cm distal to the GE junction and was secured to the stomach. The neurovascular elements of the lesser omentum were  from the gastric wall utilizing the Harmonic scalpel. Upon completion of this, lesser curve fenestration of the lesser sac was accomplished utilizing an esophageal retractor introduced posterior to the stomach. The 34-Macedonian orogastric tube was then retracted into the esophagus. A triple load stapling device, 60 mm in length, loaded with 2.5 mm staples was then introduced into the lesser curve fenestration, oriented perpendicular to the lesser curvature, 5 cm distal to the GE junction prior to firing one-third the distance of the staple line. The 34-Macedonian orogastric tube was then advanced utilizing as a sizing template for construction of the tubularized gastric pouch placed on the lesser curvature of the stomach. The initial vertical aspect of the fascia was created with a triple load sapling device, 60 mm in length, loaded with 2.5 mm staples firing one-thirds of length of the staple load. The remainder of the pouch was created with sequential firings of a triple load stapling device, 60 mm in length, loaded with 2.5 mm staples and then transecting at the angle of His.   The initial firing of the full length of the 60 mm device utilized a Vicryl buttressing strip strip. This created a 15 mL separate tubularized gastric pouch placed on the lesser curvature of the stomach. The Say limb was elevated in posterior stomach in retrogastric position where I attempted to free hand sewn and tubularized gastrojejunostomy was constructed. Geometric orientation of the gastrojejunostomy was at the distal aspect of tubularized gastric pouch to the antimesenteric border of the Say limb 2 cm distal to the staple line. At the posterior Say limb, seromuscular 3-0 Vicryl suture was placed. A 12 mm transverse gastrotomy was made at the distal aspect of the tubularized gastric pouch and adjacent 12 mm limb enterotomy. The running layer of full-thickness 3-0 Vicryl suture was then begun in the left lateral posterior aspect of the anastomosis, running across the posterior aspect of the anastomosis, and around the right lateral aspect of the anastomosis to the anterior midline. A second full-thickness 3-0 Vicryl suture was began adjacent to the origin of the first and run along the left lateral aspect of the anastomosis to the anterior midline. A 34-Icelandic orogastric tube was then advanced across the gastrojejunal anastomosis into the Say limb prior to tying the running inner layer of full-thickness 3-0 Vicryl suture together anteriorly. The gastrojejunostomy anastomosis was then advanced across the gastrojejunal anastomosis into the Say limb prior to tying the sutures together in anterior midline. The gastrojejunostomy anastomosis was then completed anteriorly utilizing running seromuscular 3-0 Vicryl suture. The 34-Icelandic orogastric tube was removed, and after ensuring there was adequate redundancy of the Say limb above the level of the mesocolon, the omentum and transverse colon were reflected superiorly.   The space through which a Garcia hernia might occur was closed with a running 2-0 silk suture and the mesocolic defect was closed with a series of simple interrupted 2-0 silk sutures. The omentum and transverse colon were returned to their normal anatomic position. The Say limb was noted to be viable with adequate redundancy above the level of the mesocolon. There was no tension noted at the gastrojejunal anastomosis. The laparoscope was then placed through the lateral 5 mm trocar site to allow closure of the supraumbilical fascial trocar defect with a suture passer device and #2 Vicryl suture. Operative sites were then inspected and noted to be hemostatic. The abdomen was insufflated with carbon dioxide gas and under direct vision trocar was removed. The fascial sutures were tied. The wounds were irrigated with normal saline solution and closure of the skin was accomplished with a series of  simple interrupted buried deep dermal 4-0 Monocryl sutures. The wounds were then infiltrated with equal mixture of 1% lidocaine plain and mixed in equal portions of 0.5% Marcaine diluted in 1:200,000 epinephrine utilizing a total volume of 50 mL. Steri-Strips were applied as well as sterile dressing. Sponge and needle counts were correct towards the end and completion of the procedure. The patient tolerated the procedure without operative complications, subsequently was extubated, and transported to the recovery room in awake, alert, and stable condition. The estimated blood loss was 25 mL. The patient received 200 mL of crystalloid during the procedure with the urine output of 275 mL. The operative start time was 08:07, completion time 10:10.       Eva Sanchez DO      [id number]  D:  09/09/2019 15:33  T:  09/10/2019 4:14  JOB #:  [id number]

## 2019-09-10 NOTE — PROGRESS NOTES
Surgery Progress Note    9/10/2019    Admit Date: 9/9/2019    Subjective:     Patient without complaints post-operatively. Pain is controlled with current regimen. Patient has been ambulating in halls. He reports no nausea and no vomiting and is tolerating ice chips well. Objective:     Blood pressure 133/84, pulse 74, temperature 98.7 °F (37.1 °C), resp. rate 16, height 5' 6\" (1.676 m), SpO2 99 %. No intake/output data recorded. 09/08 1901 - 09/10 0700  In: 6662.5 [P.O.:200; I.V.:6462.5]  Out: 3750 [Urine:3725]    EXAM: GENERAL: alert, pleasant, no distress   HEART: regular rate and rhythm   LUNGS: clear to auscultation   ABDOMEN:  Soft, obese, appropriately tender, nondistended, incisions clean, dry, no erythema or drainage   EXTREMITIES: warm, well perfused    Data Review    Recent Results (from the past 24 hour(s))   CBC W/O DIFF    Collection Time: 09/10/19  6:25 AM   Result Value Ref Range    WBC 9.3 4.6 - 13.2 K/uL    RBC 4.91 4.70 - 5.50 M/uL    HGB 15.0 13.0 - 16.0 g/dL    HCT 44.2 36.0 - 48.0 %    MCV 90.0 74.0 - 97.0 FL    MCH 30.5 24.0 - 34.0 PG    MCHC 33.9 31.0 - 37.0 g/dL    RDW 12.8 11.6 - 14.5 %    PLATELET 872 841 - 255 K/uL    MPV 11.4 9.2 - 56.9 FL   METABOLIC PANEL, BASIC    Collection Time: 09/10/19  6:25 AM   Result Value Ref Range    Sodium 138 136 - 145 mmol/L    Potassium 4.1 3.5 - 5.5 mmol/L    Chloride 103 100 - 111 mmol/L    CO2 35 (H) 21 - 32 mmol/L    Anion gap 0 (L) 3.0 - 18 mmol/L    Glucose 113 (H) 74 - 99 mg/dL    BUN 12 7.0 - 18 MG/DL    Creatinine 1.11 0.6 - 1.3 MG/DL    BUN/Creatinine ratio 11 (L) 12 - 20      GFR est AA >60 >60 ml/min/1.73m2    GFR est non-AA >60 >60 ml/min/1.73m2    Calcium 9.1 8.5 - 10.1 MG/DL       Assessment:   Sharita Pagan is a 52 y.o. male, postop day 1 status post laparoscopic gastric bypass surgery.   Condition: good    Plan:   -Ambulate every four hours  -Oxycodone 5mg 1-2 tabs po every 4-6 hour prn pain uncontrolled by tylenol  -Advance to Clear liquid Gastric Bypass Diet, if able to tolerate clear liquid diet 4oz per hour one of which being a protein supplement, will discharge home later today          Noni Read, [facility]   Office: [phone]

## 2019-09-10 NOTE — PROGRESS NOTES
Patient was educated on progression of diet this AM. Goal of 4 ounces per hour with one ounce being protein was clearly understood. Patient was instructed to go slow with small sips to reach goal. Patient given a report card to record intake. Education completed on I.S use and to ambulate in vazquez at least 4 times. Will follow up and check progression. Time spent using I.S and teaching.

## 2019-09-13 NOTE — DISCHARGE SUMMARY
Bariatric Surgery Discharge Progress Note    Admission Date: 9/9/2019    Discharge Date: 9/10/2019      Admission Diagnosis:    Clinically severe Obesity    Comorbidities:  Hypertension, prediabetes, hypertriglyceridemia, hypercholesterolemia, gastroesophageal reflux disease, CPAP dependent obstructive sleep apnea, reactive airway disease, weight related arthropathy-back    Discharge Diagnosis:     Clinically Severe Obesity, s/p laparoscopic gastric bypass surgery with comorbidities as listed above    Procedures:   laparoscopic gastric bypass surgery    Postop Complications: none    Hospital Course:  Patient was admitted on 9/9/2019 for scheduled bariatric surgery. Operation was without significant complication. Patient admitted to the floor postoperatively, monitored as per protocol. Diet sequentially advanced beginning POD 1, pain medications transitioned to oral during the hospital course. At the time of discharge, the patient is afebrile, vital signs stable, tolerating a clear liquid diet with protein supplementation, voiding spontaneously, ambulatory with adequate pain control with oral medications and clear surgical sites without evidence of infection.     Discharge Diet:  Clear Liquid Bariatric Diet for 7 days, then soft moist protein diet for 5 weeks    Discharge Medications:   *All medications as per Medical Reconciliation Form\"    Flintstones Complete Chewable vitamins, 2 orally daily for life  Calcium Citrate 2000mg orally daily for life  Vitamin B12 1000micrograms sublingual daily for life  Oxycodone 5mg tab 1-2 by mouth every 4-6 hours as needed for pain uncontrolled with Tylenol  Enoxaparin (Lovenox) 40mg sub-Q daily for 7 days    Discharge disposition: home        Local wound care with daily showers, keep wounds clean and dry    Activity: as desired, no lifting greater than 15lbs or situps for 30 days    Special Instructions:   No driving until activity is not influenced by incisional pain and off narcotics   No bath or hot tub until wounds are healed   Pulse and temperature twice daily for 10 days   Notify [facility] for a Temp >100.5 or Pulse>115    Followup with surgeon in 2 weeks      JESUS Weiner  [facility]   Office: [phone]  Attestation:  I have personally reviewed the evaluation with the PALAUREN. I agree with the plan for the patient's ongoing problems and participated in the formulation of the plan of treatment which will be carried out as per above.     Shirley Mcneal DO, FACS

## 2019-09-16 ENCOUNTER — TELEPHONE (OUTPATIENT)
Dept: MEDSURG UNIT | Age: 49
End: 2019-09-16

## 2019-09-16 NOTE — TELEPHONE ENCOUNTER
Post Op Follow Up Call- Bariatric Surgery  Date of Surgery          Type of Surgery    Pain   V.S  Lovenox  Water  Protein  Other  Walking  BM  Nausea  Vomiting  Vitamins  Recommendations  Instructed to call office if not getting enough fluids  Instructed to call office for ant problems before visiting E.R    Follow up appointment  Surgeon  Dietitian

## 2019-09-24 ENCOUNTER — OFFICE VISIT (OUTPATIENT)
Dept: SURGERY | Age: 49
End: 2019-09-24

## 2019-09-24 VITALS
DIASTOLIC BLOOD PRESSURE: 81 MMHG | HEART RATE: 62 BPM | HEIGHT: 66 IN | WEIGHT: 215 LBS | TEMPERATURE: 97.1 F | BODY MASS INDEX: 34.55 KG/M2 | SYSTOLIC BLOOD PRESSURE: 113 MMHG | OXYGEN SATURATION: 97 %

## 2019-09-24 DIAGNOSIS — K90.89 OTHER SPECIFIED INTESTINAL MALABSORPTION: Primary | ICD-10-CM

## 2019-09-24 NOTE — PROGRESS NOTES
Subjective:      Sami Bonilla is a 52 y.o. [place] male who presents in follow-up status post laparoscopic gastric bypass September 9, 2019 noting expected decreasing incisional discomfort no longer requiring analgesics and otherwise without complaint. Tolerant of an compliant with a bariatric diet as prescribed with appropriate early satiety no specific food intolerances or pathologic emesis. The patient has not attempted density carbohydrates. Compliant with multivitamin, calcium citrate, vitamin B1, by B12, vitamin D, and Actigall supplementation. Exercise regimen: Walking 30 minutes daily. Comorbidities: Hypertension, obstructive sleep apnea-resolved. Weight related arthropathy-improved. Hypercholesterolemia, hypertriglyceridemia-not evaluated. Preoperative weight: 245. Current weight: 215. BMI: 34.  Estimated weight loss: 80.  Current weight loss: 30.   Goal weight: 64.  Percentage weight loss: 80% / 15 days        Weight Loss Metrics 9/24/2019 9/24/2019 9/9/2019 8/29/2019 8/8/2019 8/7/2019 8/7/2019   Pre op / Initial Wt 264 - - - - 245 -   Today's Wt - 215 lb - - 245 lb - 245 lb   BMI - 34.7 kg/m2 39.54 kg/m2 39.54 kg/m2 - - 39.54 kg/m2   Ideal Body Wt 145 - - - - 145 -   Excess Body Wt 119 - - - - 100 -   Goal Wt 169 - - - - 165 -   Wt loss to date 49 - - - - 0 -   % Wt Loss 0.51 - - - - 0 -   80% EBW 95.2 - - - - 80 -               Past Medical History:   Diagnosis Date    Hypertension     Ill-defined condition     elevated cholesterol    Morbid obesity ([facility] [address]     Psychiatric disorder     anxiety with depression    Sleep apnea     uses sleep apnea       Past Surgical History:   Procedure Laterality Date    COLONOSCOPY N/A 2/1/2018    COLONOSCOPY, DIAGNOSTIC with polypectomy  performed by Kassandra Agudelo MD at [address] [place] [address] HX GASTRIC BYPASS         Current Outpatient Medications   Medication Sig Dispense Refill    hyoscyamine sulfate (CYSTOSPAZ) 0.125 mg tablet 1 Tab by SubLINGual route every six (6) hours as needed for Other (Spasm). 15 Tab 0    ursodiol (ACTIGALL) 300 mg capsule Take 1 Cap by mouth two (2) times a day. 120 Cap 2    amLODIPine (NORVASC) 5 mg tablet amlodipine 5 mg tablet      atorvastatin (LIPITOR) 40 mg tablet Take 40 mg by mouth two (2) times a day.  ezetimibe (ZETIA) 10 mg tablet Take 10 mg by mouth daily.  sertraline (ZOLOFT) 100 mg tablet Take 200 mg by mouth nightly.  enoxaparin (LOVENOX) 40 mg/0.4 mL 0.4 mL by SubCUTAneous route daily. 7 Syringe 0       No Known Allergies      Objective:     Visit Vitals  /81 (BP 1 Location: Right arm, BP Patient Position: Sitting)   Pulse 62   Temp 97.1 °F (36.2 °C) (Oral)   Ht 5' 6\" (1.676 m)   Wt 97.5 kg (215 lb)   SpO2 97%   BMI 34.70 kg/m²       General:  Alert, oriented x3, nontoxic in appearance   Chest: Clear to auscultation without adventitious sounds with equal and symmetric excursions bilaterally   Cor: Regular rate and rhythm without rub gallop or murmur   Abdomen: Soft with normoactive bowel sounds and no masses, megaly, wounds clean dry approximated with appropriate surrounding induration and incisional tenderness without evidence of infectious complications or incisional hernia           Assessment:     History of clinically severe obesity status post laparoscopic gastric bypass September 9, 2019 with appropriate weight loss and comorbidity resolution    Plan:     Continue pursuit of the bariatric diet as prescribed with advancement as per bariatric nutrition, continue current exercise regimen and vitamin supplementation protocol, encourage monthly attendance at bariatric support group meeting.   Follow-up 3 months with bariatric 3-month labs for ongoing bariatric follow-up

## 2019-10-17 ENCOUNTER — DOCUMENTATION ONLY (OUTPATIENT)
Dept: SURGERY | Age: 49
End: 2019-10-17

## 2019-10-17 NOTE — PROGRESS NOTES
[facility]  POST-OP NUTRITION FOLLOW UP    Patient's Name: Oksana Stoddard  YOB: 1970  Surgery Date: 09/09/2019      Procedure: LGBP    Surgeon: Dr. Teresa Paiz    Height: 5'6\"     Pre-Op Weight: 264     Current Weight: 209  Weight Lost: 35#      Complications  Readmittance: no  Reoperations: no  Complications: no  IV Fluids: no  ER Trips: no    Problem Areas:   Nausea: no   Vomiting: no   Dumping Syndrome: no  Inadequate Protein: no  Inadequate Fluids: no  Food Intolerance: no  Hunger: no  Constipation: no    Eating 3 Meals/Day:yes  Portion Size at Meals: 1-2-3     Protein from Food: 20    Foods being consumed:  Breakfast: Time:7:00  Boiled egg. Lunch: Time: 12:00   Chicken, tuna. Dinner:  Time: 6:00   Chicken, tuna, beans. In-between eating: Protein drink. Length of time for meals: 20-30    food/fluids: yes    Fluids: >64 oz/day   Types of Fluids: water, protein drink. , Crystal light. [facility]    Number/Day: 2   Taken Separately: yes    Vitamin B1: 100 mg  Dosing:  daily    Calcium: 500 mg     Calcium Dosing: daily    Taken Separately: yes    Vitamin B12: 1000 mcg   Vitamin B12 Dosing: dialy    Vitamin D: 5000 iu     Vitamin D dosing: daily     Protein Supplement: Premier. Grams of Protein: 30   Patient is taking 7 days a week. Exercise: walking. Comments:    Patient is doing very well. Patient was educated on the importance of eating meat and vegetables only. I have talked with patient about the effects of carbohydrates, not only from a weight management perspective, but also what effects it could have on their blood sugar and what reactive hypoglycemia is. Diet Follow Up:  9 month class scheduled.     Nara Montgomery, CAN    10/17/2019

## 2019-12-16 ENCOUNTER — TELEPHONE (OUTPATIENT)
Dept: SURGERY | Age: 49
End: 2019-12-16

## 2019-12-16 DIAGNOSIS — E66.01 MORBID OBESITY (HCC): Primary | ICD-10-CM

## 2019-12-16 DIAGNOSIS — K90.89 OTHER SPECIFIED INTESTINAL MALABSORPTION: ICD-10-CM

## 2019-12-16 NOTE — TELEPHONE ENCOUNTER
Called patient to remind him to obtain bariatric labs. Patient will get them done after appt tomorrow.

## 2019-12-17 ENCOUNTER — OFFICE VISIT (OUTPATIENT)
Dept: SURGERY | Age: 49
End: 2019-12-17

## 2019-12-17 VITALS
BODY MASS INDEX: 32.3 KG/M2 | WEIGHT: 201 LBS | TEMPERATURE: 97.2 F | DIASTOLIC BLOOD PRESSURE: 80 MMHG | HEART RATE: 58 BPM | OXYGEN SATURATION: 97 % | HEIGHT: 66 IN | SYSTOLIC BLOOD PRESSURE: 124 MMHG

## 2019-12-17 DIAGNOSIS — I10 BENIGN HYPERTENSION: ICD-10-CM

## 2019-12-17 DIAGNOSIS — K90.89 OTHER SPECIFIED INTESTINAL MALABSORPTION: Primary | ICD-10-CM

## 2019-12-17 RX ORDER — LANOLIN ALCOHOL/MO/W.PET/CERES
CREAM (GRAM) TOPICAL DAILY
COMMUNITY
End: 2022-05-18

## 2019-12-17 RX ORDER — ACETAMINOPHEN 500 MG
TABLET ORAL 2 TIMES DAILY
COMMUNITY
End: 2022-05-18

## 2019-12-17 RX ORDER — BISMUTH SUBSALICYLATE 262 MG
1 TABLET,CHEWABLE ORAL DAILY
COMMUNITY
End: 2022-05-18

## 2019-12-17 RX ORDER — UREA 10 %
100 LOTION (ML) TOPICAL DAILY
COMMUNITY
End: 2022-05-18

## 2019-12-17 NOTE — PROGRESS NOTES
Subjective:      Rafael Buerger is a 52 y.o. [place] male who presents in follow-up status post laparoscopic gastric bypass September 19, 2019 for routine 3-month bariatric follow-up without complaint. Tolerant of an compliant with a regular bariatric diet with appropriate early satiety no specific food intolerances or pathologic emesis. The patient does note some difficulty with dry chicken breast which he now avoids preferring dark meat. Patient has not attempted density carbohydrates and therefore has not experienced dumping syndrome. Compliant multivitamin, calcium citrate, B1, B12, and vitamin D supplementation. Exercise regimen: Walking 45 minutes daily. Comorbidities: Hypercholesterolemia, gastroesophageal reflux disease, reactive airway disease, obstructive sleep apnea-resolved. Hypertriglyceridemia weight related arthropathy-improved. Weight: 264. Current weight: 201. BMI: 32.  Estimated weight loss: 95.  Current weight loss: 63.  Goal weight: 169.   Percentage weight loss: 66% / 3 months        Weight Loss Metrics 12/17/2019 9/24/2019 9/24/2019 9/9/2019 8/29/2019 8/8/2019 8/7/2019   Pre op / Initial Wt - 264 - - - - 245   Today's Wt 201 lb - 215 lb - - 245 lb -   BMI 32.44 kg/m2 - 34.7 kg/m2 39.54 kg/m2 39.54 kg/m2 - -   Ideal Body Wt - 145 - - - - 145   Excess Body Wt - 119 - - - - 100   Goal Wt - 169 - - - - 165   Wt loss to date - 49 - - - - 0   % Wt Loss - 0.51 - - - - 0   80% EBW - 95.2 - - - - 80               Past Medical History:   Diagnosis Date    Hypertension     Ill-defined condition     elevated cholesterol    Morbid obesity (HCC)     Psychiatric disorder     anxiety with depression    Sleep apnea     uses sleep apnea       Past Surgical History:   Procedure Laterality Date    COLONOSCOPY N/A 2/1/2018    COLONOSCOPY, DIAGNOSTIC with polypectomy  performed by Casi Acosta MD at [address] [place] [address]         Current Outpatient Medications   Medication Sig Dispense Refill    multivitamin (ONE A DAY) tablet Take 1 Tab by mouth daily.  thiamine HCL (VITAMIN B-1) 100 mg tablet Take  by mouth daily.  calcium-cholecalciferol, d3, (CALCIUM 600 + D) 600-125 mg-unit tab Take  by mouth.  cyanocobalamin (VITAMIN B12) 100 mcg tablet Take 100 mcg by mouth daily.  cholecalciferol (VITAMIN D3) (2,000 UNITS /50 MCG) cap capsule Take  by mouth two (2) times a day.  enoxaparin (LOVENOX) 40 mg/0.4 mL 0.4 mL by SubCUTAneous route daily. 7 Syringe 0    hyoscyamine sulfate (CYSTOSPAZ) 0.125 mg tablet 1 Tab by SubLINGual route every six (6) hours as needed for Other (Spasm). 15 Tab 0    ursodiol (ACTIGALL) 300 mg capsule Take 1 Cap by mouth two (2) times a day. 120 Cap 2    amLODIPine (NORVASC) 5 mg tablet amlodipine 5 mg tablet      atorvastatin (LIPITOR) 40 mg tablet Take 40 mg by mouth two (2) times a day.  ezetimibe (ZETIA) 10 mg tablet Take 10 mg by mouth daily.  sertraline (ZOLOFT) 100 mg tablet Take 200 mg by mouth nightly.          No Known Allergies      Objective:     Visit Vitals  /80 (BP 1 Location: Right arm, BP Patient Position: Sitting)   Pulse (!) 58   Temp 97.2 °F (36.2 °C)   Ht 5' 6\" (1.676 m)   Wt 91.2 kg (201 lb)   SpO2 97%   BMI 32.44 kg/m²       General:  Alert, oriented x3, nontoxic in appearance   Chest: Clear to auscultation without adventitious sounds with equal and symmetric excursions bilaterally   Cor: Regular rate and rhythm without rub gallop or murmur   Abdomen: Soft with normoactive bowel sounds and no masses, megaly, incisional hernia         November 8, 2019 vitamin B12, vitamin D, lipid panel, CMP                                 B12 3474, triglycerides 152, SGOT 13 with otherwise normal laboratory parameters                   Vitamin B-1, iron, CBC not drawn  Assessment:     Status post laparoscopic gastric bypass September 19, 2019 with greater than expected weight loss and appropriate comorbidity resolution    Plan:     Continue pursuit and compliance with a regular bariatric diet, vitamin supplementation protocol, exercise regimen, encourage monthly attendance at bariatric support group meetings.   Obtain CBC, iron, vitamin B1 labs  Follow-up March 2020 with 6-month labs for bariatric surgery/nutrition evaluation

## 2019-12-17 NOTE — PROGRESS NOTES
Bre Elias is a 52 y.o. male (: 1970) presenting to address:    Chief Complaint   Patient presents with    Weight Management     follow up for GBP       Medication list and allergies have been reviewed with Bre Elias and updated as of today's date. I have gone over all Medical, Surgical and Social History with Bre Elias and updated/added the information accordingly. 1. Have you been to the ER, Urgent Care or Hospitalized since your last visit? NO      2. Have you followed up with your PCP or any other Physicians since your procedure/ last office visit?    YES

## 2020-08-18 ENCOUNTER — DOCUMENTATION ONLY (OUTPATIENT)
Dept: SURGERY | Age: 50
End: 2020-08-18

## 2020-08-18 NOTE — LETTER
[facility] Surgical Specialist 
DOMITILA/Nguyễn Mcconnell. [address] [facility], [address] [place] [address] Loss [facility] Dear Patient, Your health is our main concern. It is important for your health to have follow-up lab work and to see your surgeon at 3 months, 6 months and annually after your weight loss surgery. Additionally, the Department of Bariatric Surgery at our hospital is a member of the [employer] of [address] [place] [address] Surgical Quality Improvement Program ([facility] NSQIP). As a participant in this program, we gather information on the outcomes of our patients after surgery. Please call the office for a follow up appointment at [phone]. If you have moved out of the area or have changed surgeons please call us and let us know the name of your doctor. Your health and feedback are important to us. We greatly appreciate your response. Thank you, [facility] Loss [place] [employer]

## 2020-08-18 NOTE — PROGRESS NOTES
Per [facility] requirements;  E-mail and letter sent for follow up appointment. [address] [place] [address] Surgical Specialist  C/Nguyễn Menjivar. [address] [facility] Weight [facility] [place]   [address] [place] [address] Surgical Specialists  Yogesh        Dear Patient,        Your health is our main concern. It is important for your health to have follow-up lab work and to see your surgeon at 3 months, 6 months and annually after your weight loss surgery. Additionally, the Department of Bariatric Surgery at our hospital is a member of the [employer] of [address] [place] [address] Surgical Quality Improvement Program ([facility] NSQIP). As a participant in this program, we gather information on the outcomes of our patients after surgery. Please call the office for a follow up appointment at [phone]. If you have moved out of the area or have changed surgeons please call us and let us know the name of your doctor. Your health and feedback are important to us. We greatly appreciate your response.        Thank you,  [address] [place] [address] [address] [facility]

## 2020-09-28 ENCOUNTER — TELEPHONE (OUTPATIENT)
Dept: SURGERY | Age: 50
End: 2020-09-28

## 2020-09-28 DIAGNOSIS — E78.00 HYPERCHOLESTEREMIA: ICD-10-CM

## 2020-09-28 DIAGNOSIS — K90.89 OTHER SPECIFIED INTESTINAL MALABSORPTION: Primary | ICD-10-CM

## 2020-09-28 DIAGNOSIS — E66.01 MORBID OBESITY (HCC): ICD-10-CM

## 2020-09-29 ENCOUNTER — HOSPITAL ENCOUNTER (OUTPATIENT)
Dept: LAB | Age: 50
Discharge: HOME | End: 2020-09-29
Payer: MEDICARE

## 2020-09-29 DIAGNOSIS — E66.01 MORBID OBESITY (HCC): ICD-10-CM

## 2020-09-29 DIAGNOSIS — K90.89 OTHER SPECIFIED INTESTINAL MALABSORPTION: ICD-10-CM

## 2020-09-29 DIAGNOSIS — E78.00 HYPERCHOLESTEREMIA: ICD-10-CM

## 2020-09-29 LAB
ALBUMIN SERPL-MCNC: 4.3 G/DL (ref 3.4–5)
ALBUMIN/GLOB SERPL: 1.3 {RATIO} (ref 0.8–1.7)
ALP SERPL-CCNC: 113 U/L (ref 45–117)
ALT SERPL-CCNC: 57 U/L (ref 16–61)
ANION GAP SERPL CALC-SCNC: 4 MMOL/L (ref 3–18)
AST SERPL-CCNC: 50 U/L (ref 10–38)
BASOPHILS # BLD: 0 K/UL (ref 0–0.1)
BASOPHILS NFR BLD: 0 % (ref 0–2)
BILIRUB SERPL-MCNC: 0.7 MG/DL (ref 0.2–1)
BUN SERPL-MCNC: 16 MG/DL (ref 7–18)
BUN/CREAT SERPL: 14 (ref 12–20)
CALCIUM SERPL-MCNC: 9 MG/DL (ref 8.5–10.1)
CHLORIDE SERPL-SCNC: 108 MMOL/L (ref 100–111)
CO2 SERPL-SCNC: 28 MMOL/L (ref 21–32)
CREAT SERPL-MCNC: 1.13 MG/DL (ref 0.6–1.3)
DIFFERENTIAL METHOD BLD: NORMAL
EOSINOPHIL # BLD: 0.2 K/UL (ref 0–0.4)
EOSINOPHIL NFR BLD: 2 % (ref 0–5)
ERYTHROCYTE [DISTWIDTH] IN BLOOD BY AUTOMATED COUNT: 12.4 % (ref 11.6–14.5)
FERRITIN SERPL-MCNC: 174 NG/ML (ref 8–388)
FOLATE SERPL-MCNC: >20 NG/ML (ref 3.1–17.5)
GLOBULIN SER CALC-MCNC: 3.2 G/DL (ref 2–4)
GLUCOSE SERPL-MCNC: 89 MG/DL (ref 74–99)
HCT VFR BLD AUTO: 44.6 % (ref 36–48)
HGB BLD-MCNC: 15.2 G/DL (ref 13–16)
IRON SERPL-MCNC: 97 UG/DL (ref 50–175)
LYMPHOCYTES # BLD: 2.6 K/UL (ref 0.9–3.6)
LYMPHOCYTES NFR BLD: 33 % (ref 21–52)
MCH RBC QN AUTO: 31.5 PG (ref 24–34)
MCHC RBC AUTO-ENTMCNC: 34.1 G/DL (ref 31–37)
MCV RBC AUTO: 92.3 FL (ref 74–97)
MONOCYTES # BLD: 0.5 K/UL (ref 0.05–1.2)
MONOCYTES NFR BLD: 6 % (ref 3–10)
NEUTS SEG # BLD: 4.7 K/UL (ref 1.8–8)
NEUTS SEG NFR BLD: 59 % (ref 40–73)
PLATELET # BLD AUTO: 332 K/UL (ref 135–420)
PMV BLD AUTO: 11.8 FL (ref 9.2–11.8)
POTASSIUM SERPL-SCNC: 4.2 MMOL/L (ref 3.5–5.5)
PROT SERPL-MCNC: 7.5 G/DL (ref 6.4–8.2)
RBC # BLD AUTO: 4.83 M/UL (ref 4.7–5.5)
SODIUM SERPL-SCNC: 140 MMOL/L (ref 136–145)
VIT B12 SERPL-MCNC: >2000 PG/ML (ref 211–911)
WBC # BLD AUTO: 7.9 K/UL (ref 4.6–13.2)

## 2020-09-29 PROCEDURE — 84425 ASSAY OF VITAMIN B-1: CPT

## 2020-09-29 PROCEDURE — 82728 ASSAY OF FERRITIN: CPT

## 2020-09-29 PROCEDURE — 82607 VITAMIN B-12: CPT

## 2020-09-29 PROCEDURE — 80053 COMPREHEN METABOLIC PANEL: CPT

## 2020-09-29 PROCEDURE — 36415 COLL VENOUS BLD VENIPUNCTURE: CPT

## 2020-09-29 PROCEDURE — 85025 COMPLETE CBC W/AUTO DIFF WBC: CPT

## 2020-09-29 PROCEDURE — 83540 ASSAY OF IRON: CPT

## 2020-10-03 LAB — VIT B1 BLD-SCNC: 151.3 NMOL/L (ref 66.5–200)

## 2021-01-28 ENCOUNTER — OFFICE VISIT (OUTPATIENT)
Dept: SURGERY | Age: 51
End: 2021-01-28
Payer: MEDICARE

## 2021-01-28 VITALS
DIASTOLIC BLOOD PRESSURE: 76 MMHG | SYSTOLIC BLOOD PRESSURE: 115 MMHG | OXYGEN SATURATION: 98 % | TEMPERATURE: 97 F | HEART RATE: 50 BPM | WEIGHT: 179 LBS | BODY MASS INDEX: 28.77 KG/M2 | HEIGHT: 66 IN

## 2021-01-28 DIAGNOSIS — K91.2 POSTOPERATIVE INTESTINAL MALABSORPTION: Primary | ICD-10-CM

## 2021-01-28 PROCEDURE — G8432 DEP SCR NOT DOC, RNG: HCPCS | Performed by: SURGERY

## 2021-01-28 PROCEDURE — 99214 OFFICE O/P EST MOD 30 MIN: CPT | Performed by: SURGERY

## 2021-01-28 PROCEDURE — G8419 CALC BMI OUT NRM PARAM NOF/U: HCPCS | Performed by: SURGERY

## 2021-01-28 PROCEDURE — 3017F COLORECTAL CA SCREEN DOC REV: CPT | Performed by: SURGERY

## 2021-01-28 PROCEDURE — G8427 DOCREV CUR MEDS BY ELIG CLIN: HCPCS | Performed by: SURGERY

## 2021-01-28 RX ORDER — CHOLECALCIFEROL (VITAMIN D3) 50 MCG
CAPSULE ORAL
COMMUNITY
End: 2022-05-18

## 2021-01-28 NOTE — PROGRESS NOTES
Arabella St is a 46 y.o. male (: 1970) presenting to address:    Chief Complaint   Patient presents with    Follow-up     Annual GBP visit/ LGBP 19       Medication list and allergies have been reviewed with Arabella St and updated as of today's date. I have gone over all Medical, Surgical and Social History with Arabella St and updated/added the information accordingly. 1. Have you been to the ER, Urgent Care or Hospitalized since your last visit? NO      2. Have you followed up with your PCP or any other Physicians since your procedure/ last office visit?    YES

## 2021-01-28 NOTE — PROGRESS NOTES
Bariatric Follow-Up Evaluation    Suha Bell is a 46 y.o. [place] male status post laparoscopic gastric bypass September 19, 2019 who presents in follow-up for annual bariatric reevaluation without specific complaint. Tolerant of and compliant with a appropriate regular bariatric diet without specific food intolerances or pathologic emesis. The patient is not attempted high-density carbohydrates and therefore has not experienced dumping syndrome  Compliant with multivitamin, calcium citrate, vitamin B1, vitamin B12, and vitamin D supplementation  Exercise regimen: Walking 30 minutes daily  Comorbidities: Gastroesophageal reflux disease, reactive airway disease, obstructive sleep apnearesolved                           Weight related arthropathyimproved                            Hypertriglyceridemianot evaluated  Preop weight: 246. Current weight: 179. BMI: 28.  Estimated weight loss: 81.  Current weight loss: 83. Goal weight: 165  Percentage weight loss: 102% / 18 months    Weight Loss Metrics 1/28/2021 1/28/2021 12/17/2019 9/24/2019 9/24/2019 9/9/2019 8/29/2019   Pre op / Initial Wt 246 - - 264 - - -   Today's Wt - 179 lb 201 lb - 215 lb - -   BMI - 28.89 kg/m2 32.44 kg/m2 - 34.7 kg/m2 39.54 kg/m2 39.54 kg/m2   Ideal Body Wt 145 - - 145 - - -   Excess Body Wt 101 - - 119 - - -   Goal Wt 165 - - 169 - - -   Wt loss to date 67 - - 49 - - -   % Wt Loss 0.83 - - 0.51 - - -   80% EBW 80.8 - - 95.2 - - -       No Known Allergies    Current Outpatient Medications on File Prior to Visit   Medication Sig Dispense Refill    omega 3-dha-epa-fish oil (Fish Oil) 100-160-1,000 mg cap Take  by mouth.  multivitamin (ONE A DAY) tablet Take 1 Tab by mouth daily.  thiamine HCL (VITAMIN B-1) 100 mg tablet Take  by mouth daily.  calcium-cholecalciferol, d3, (CALCIUM 600 + D) 600-125 mg-unit tab Take  by mouth.  cyanocobalamin (VITAMIN B12) 100 mcg tablet Take 100 mcg by mouth daily.       cholecalciferol (VITAMIN D3) (2,000 UNITS /50 MCG) cap capsule Take  by mouth two (2) times a day.  enoxaparin (LOVENOX) 40 mg/0.4 mL 0.4 mL by SubCUTAneous route daily. 7 Syringe 0    hyoscyamine sulfate (CYSTOSPAZ) 0.125 mg tablet 1 Tab by SubLINGual route every six (6) hours as needed for Other (Spasm). 15 Tab 0    ursodiol (ACTIGALL) 300 mg capsule Take 1 Cap by mouth two (2) times a day. 120 Cap 2    amLODIPine (NORVASC) 5 mg tablet amlodipine 5 mg tablet      atorvastatin (LIPITOR) 40 mg tablet Take 40 mg by mouth two (2) times a day.  ezetimibe (ZETIA) 10 mg tablet Take 10 mg by mouth daily.  sertraline (ZOLOFT) 100 mg tablet Take 200 mg by mouth nightly. No current facility-administered medications on file prior to visit. Past Medical History:   Diagnosis Date    Hypertension     Ill-defined condition     elevated cholesterol    Morbid obesity ([address]     Psychiatric disorder     anxiety with depression    Sleep apnea     uses sleep apnea       Past Surgical History:   Procedure Laterality Date    COLONOSCOPY N/A 2018    COLONOSCOPY, DIAGNOSTIC with polypectomy  performed by [facility]iana Lopez [facility] at [facility] ENDOSCOPY    HX GASTRIC BYPASS         Social History     Tobacco Use    Smoking status: Former Smoker     Quit date: 1986     Years since quittin.4    Smokeless tobacco: Never Used   Substance Use Topics    Alcohol use: No    Drug use: No       Family History   Problem Relation Age of Onset    Asthma Mother     Cancer Mother     Cancer Father        ROS:  General: Negative for fevers, chills, night sweats, fatigue, weight loss, or weight gain. GI: Negative for abdominal pain, change in bowel habits, hematochezia, melena, or GERD. Integumentary: Negative for dermatitis or abnormal moles.     HEENT: Negative for visual changes, vertigo, epistaxis, dysphagia, or hoarseness    Cardiac: Negative for chest pain, palpitations, hypertension, edema, or varicosities    Resp: Negative for cough, shortness of breath, wheezing, hemoptysis, snoring, or reactive airway disease    : Negative for hematuria, dysuria, frequency, urgency, nocturia, or stress urinary incontinence    MSK:  Negative for weakness, joint pain, or arthritis    Endocrine: Negative for diabetes, thyroid disease, polyuria, polydipsia, polyphagia, poor wound, heat intolerance, or cold intolerance. Lymph/Heme: Negative for anemia, bruising, or history of blood transfusions. Neuro:  Negative for dizziness, headache, fainting, seizures, and stroke. Psychiatry:  Negative for anxiety or depression    Physical Exam    Visit Vitals  /76 (BP 1 Location: Right arm, BP Patient Position: Sitting)   Pulse (!) 50   Temp 97 °F (36.1 °C)   Ht 5' 6\" (1.676 m)   Wt 81.2 kg (179 lb)   SpO2 98%   BMI 28.89 kg/m²       Nursing note reviewed. General: 46 y.o. male is in no acute distress. Resp: Clear to auscultation bilaterally, no wheezing, rhonchi, or rales, excursions normal and symmetrical.  Cardio: Regular rate and rhythm, no murmurs, clicks, gallops, or rubs. No edema or varicosities. Abdomen: Obese, soft, nontender, nondistended, normoactive bowel sounds, no hernias, no hepatosplenomegaly,   Psych: Alert and oriented to person, place, and time.     September 29, 2020: CBC, CMP, B1, B12, folate, iron  SGOT 52, vitamin B12 greater than 2000, folate greater than 20, vitamin D not obtained as patient will have to pay for it out of pocket, lipid panel not obtained remainder of laboratory profile within normal parameters    Impression    Status post laparoscopic gastric bypass September 19, 2019 with greater than expected weight loss and appropriate comorbidity resolution    Plan    Obtain lipid panel, formal annual bariatric nutrition evaluation  Continue compliance with bariatric surgical diet, exercise regimen, vitamin supplementation protocol, encourage monthly intensive bariatric support group meetings  Follow-up September 2021 with annual bariatric evaluation labs/appointment

## 2021-01-28 NOTE — PATIENT INSTRUCTIONS
If you have any questions or concerns about today's appointment, the verbal and/or written instructions you were given for follow up care, please call our office at [phone]. [facility] - [address], [address] [facility], [address] [place] [address] 
 
[phone] office [phone]

## 2021-09-09 ENCOUNTER — TELEPHONE (OUTPATIENT)
Dept: SURGERY | Age: 51
End: 2021-09-09

## 2021-10-25 ENCOUNTER — TELEPHONE (OUTPATIENT)
Dept: SURGERY | Age: 51
End: 2021-10-25

## 2021-10-25 DIAGNOSIS — K91.2 POSTOPERATIVE INTESTINAL MALABSORPTION: Primary | ICD-10-CM

## 2021-11-11 ENCOUNTER — OFFICE VISIT (OUTPATIENT)
Dept: SURGERY | Age: 51
End: 2021-11-11
Payer: MEDICARE

## 2021-11-11 VITALS
TEMPERATURE: 97 F | DIASTOLIC BLOOD PRESSURE: 72 MMHG | HEART RATE: 63 BPM | OXYGEN SATURATION: 96 % | SYSTOLIC BLOOD PRESSURE: 113 MMHG | HEIGHT: 66 IN | BODY MASS INDEX: 28.77 KG/M2 | WEIGHT: 179 LBS

## 2021-11-11 DIAGNOSIS — Z86.39 HX OF OBESITY: ICD-10-CM

## 2021-11-11 DIAGNOSIS — E66.3 OVERWEIGHT: ICD-10-CM

## 2021-11-11 DIAGNOSIS — Z98.84 S/P GASTRIC BYPASS: ICD-10-CM

## 2021-11-11 DIAGNOSIS — K91.2 POST-RESECTION MALABSORPTION: Primary | ICD-10-CM

## 2021-11-11 PROCEDURE — 99213 OFFICE O/P EST LOW 20 MIN: CPT | Performed by: NURSE PRACTITIONER

## 2021-11-11 NOTE — PROGRESS NOTES
Bariatric Postoperative Progress Note    CC: Annual LGBP Follow Up    Chelsea Montelongo is a 46 y.o. male now 2 years status post laparoscopic gastric bypass surgery performed on 9/9/2019. Doing well overall. Currently eating peant butter, cheese, chicken, tuna, fish, eggs, green peas, green beans, broccoli, collards, watermelon, bananas, strawberries, nuts, bread, pepsi,  occ cracker, rice. Taking in 64 oz water,  Unknown g protein. 0 min of activity 7 days a week. Bowel movements are regular. The patient is not having any pain. He is compliant with multivitamins, calcium, Vit D and B12 supplements. Reports drinking multiple pepsi daily. About 3 months ago, felt that sleep apnea had come back, was told to use CPAP again, and reports he is sleeping better. Sleep study currently scheduled beginning of next year. Denies any NSAID, ETOH, or tobacco use.      Weight Loss Metrics 11/11/2021 11/11/2021 1/28/2021 1/28/2021 12/17/2019 9/24/2019 9/24/2019   Pre op / Initial Wt 245 - 246 - - 264 -   Today's Wt - 179 lb - 179 lb 201 lb - 215 lb   BMI - 28.89 kg/m2 - 28.89 kg/m2 32.44 kg/m2 - 34.7 kg/m2   Ideal Body Wt 145 - 145 - - 145 -   Excess Body Wt 100 - 101 - - 119 -   Goal Wt 165 - 165 - - 169 -   Wt loss to date 66 - 67 - - 49 -   % Wt Loss 0.82 - 0.83 - - 0.51 -   80% EBW 80 - 80.8 - - 95.2 -     Comorbidities:  Hypertension: not applicable  Diabetes: not applicable  Obstructive Sleep Apnea: unchanged  Hyperlipidemia: not applicable  Stress Urinary Incontinence: not applicable  Gastroesophageal Reflux: resolved  Weight related arthropathy:improved        Past Medical History:   Diagnosis Date    Hypertension     Ill-defined condition     elevated cholesterol    Morbid obesity ([address]     Psychiatric disorder     anxiety with depression    Sleep apnea     uses sleep apnea       Past Surgical History:   Procedure Laterality Date    COLONOSCOPY N/A 2/1/2018    COLONOSCOPY, DIAGNOSTIC with polypectomy performed by Millicent Vizcarra MD at [address] [facility] HX GASTRIC BYPASS         Current Outpatient Medications   Medication Sig Dispense Refill    omega 3-dha-epa-fish oil (Fish Oil) 100-160-1,000 mg cap Take  by mouth.  multivitamin (ONE A DAY) tablet Take 1 Tab by mouth daily.  thiamine HCL (VITAMIN B-1) 100 mg tablet Take  by mouth daily.  calcium-cholecalciferol, d3, (CALCIUM 600 + D) 600-125 mg-unit tab Take  by mouth.  cyanocobalamin (VITAMIN B12) 100 mcg tablet Take 100 mcg by mouth daily.  cholecalciferol (VITAMIN D3) (2,000 UNITS /50 MCG) cap capsule Take  by mouth two (2) times a day. No Known Allergies    ROS:  Review of Systems   Constitutional: Positive for weight loss. Negative for malaise/fatigue. Respiratory: Negative for cough and shortness of breath. Cardiovascular: Negative for chest pain, palpitations and leg swelling. Gastrointestinal: Negative for abdominal pain, constipation, diarrhea, heartburn, nausea and vomiting. Genitourinary: Negative. Skin: Negative. Neurological: Negative for dizziness, tingling, loss of consciousness, weakness and headaches. Physicial Exam:  Visit Vitals  /72 (BP 1 Location: Left upper arm, BP Patient Position: Sitting)   Pulse 63   Temp 97 °F (36.1 °C)   Ht 5' 6\" (1.676 m)   Wt 81.2 kg (179 lb)   SpO2 96%   BMI 28.89 kg/m²     Physical Exam  Vitals and nursing note reviewed. Constitutional:       Appearance: He is obese. HENT:      Head: Normocephalic and atraumatic. Cardiovascular:      Rate and Rhythm: Normal rate and regular rhythm. Pulses: Normal pulses. Heart sounds: Normal heart sounds. Pulmonary:      Effort: Pulmonary effort is normal.      Breath sounds: Normal breath sounds. Abdominal:      General: Bowel sounds are normal. There is no distension. Palpations: Abdomen is soft. There is no mass. Tenderness: There is no abdominal tenderness.       Hernia: No hernia is present. Musculoskeletal:         General: Normal range of motion. Skin:     General: Skin is warm and dry. Neurological:      General: No focal deficit present. Mental Status: He is alert and oriented to person, place, and time. Psychiatric:         Mood and Affect: Mood normal.         Behavior: Behavior normal.         Labs:   Recent Results (from the past 672 hour(s))   CBC WITH AUTOMATED DIFF    Collection Time: 10/27/21  4:21 PM   Result Value Ref Range    WBC 9.3 4.0 - 11.0 1000/mm3    RBC 4.94 3.80 - 5.70 M/uL    HGB 15.6 12.4 - 17.2 gm/dl    HCT 45.2 37.0 - 50.0 %    MCV 91.5 80.0 - 98.0 fL    MCH 31.6 23.0 - 34.6 pg    MCHC 34.5 30.0 - 36.0 gm/dl    PLATELET 070 539 - 225 1000/mm3    MPV 12.0 (H) 6.0 - 10.0 fL    RDW-SD 39.3 35.1 - 43.9      NRBC 0 0 - 0      IMMATURE GRANULOCYTES 0.2 0.0 - 3.0 %    NEUTROPHILS 62.9 34 - 64 %    LYMPHOCYTES 26.9 (L) 28 - 48 %    MONOCYTES 7.2 1 - 13 %    EOSINOPHILS 2.2 0 - 5 %    BASOPHILS 0.6 0 - 3 %   FERRITIN    Collection Time: 10/27/21  4:21 PM   Result Value Ref Range    Ferritin 193.6 26.0 - 388.0 ng/ml   IRON    Collection Time: 10/27/21  4:21 PM   Result Value Ref Range    Iron 83 65 - 486 mcg/dl   METABOLIC PANEL, COMPREHENSIVE    Collection Time: 10/27/21  4:21 PM   Result Value Ref Range    Sodium 139 136 - 145 mEq/L    Potassium 3.9 3.5 - 5.1 mEq/L    Chloride 109 (H) 98 - 107 mEq/L    CO2 26 21 - 32 mEq/L    Glucose 99 74 - 106 mg/dl    BUN 12 7 - 25 mg/dl    Creatinine 1.2 0.6 - 1.3 mg/dl    GFR est AA >60      GFR est non-AA >60      Calcium 8.8 8.5 - 10.1 mg/dl    AST (SGOT) 89 (H) 15 - 37 U/L    ALT (SGPT) 75 12 - 78 U/L    Alk.  phosphatase 100 45 - 117 U/L    Bilirubin, total 0.5 0.2 - 1.0 mg/dl    Protein, total 7.4 6.4 - 8.2 gm/dl    Albumin 3.8 3.4 - 5.0 gm/dl    Anion gap 3 (L) 5 - 15 mmol/L   VITAMIN B1, WHOLE BLOOD    Collection Time: 10/27/21  4:21 PM   Result Value Ref Range    VITAMIN B1, WHOLE BLOOD 266 (H) 78 - 185 nmol/L VITAMIN B12    Collection Time: 10/27/21  4:21 PM   Result Value Ref Range    Vitamin B12 3,044 (H) 193 - 986 pg/ml   FOLATE    Collection Time: 10/27/21  4:21 PM   Result Value Ref Range    Folate 31.8 (H) 3.1 - 17.5 ng/ml       Assessment/Plan:   He is currently 2 years s/p laparoscopic gastric bypass surgery with a total weight loss of 66 lbs to date, doing well. Labs were reviewed and pt was instructed to continue MVI/B1/B12/D supplementation. Can decrease B12 and B1 to half tab daily or every other day. Hx elevated AST, being worked up by PCP. Long discussion regarding pepsi and other high density carbohydrates as they can lead to reactive hypoglycemia, weight regain, and hunger. Continue using CPAP and proceed with sleep study. We have reviewed the components of a successful postoperative course including requirement for a high protein, low carbohydrate diet, 64 oz a day of zero calorie liquids, daily vitamin supplementation, daily exercise (150 mis/week), regular follow-up, and participation in support groups. Refer to registered dietitian for more detailed medical nutrition therapy as needed. The primary encounter diagnosis was Post-resection malabsorption. Diagnoses of S/P gastric bypass, Hx of obesity, Overweight, and BMI 28.0-28.9,adult were also pertinent to this visit. Follow-up and Dispositions    · Return in about 1 year (around 11/11/2022), or if symptoms worsen or fail to improve. with labs, sooner as needed.   Liana Alan NP

## 2022-03-19 PROBLEM — E66.01 MORBID OBESITY WITH BMI OF 40.0-44.9, ADULT (HCC): Status: ACTIVE | Noted: 2019-09-09

## 2022-03-19 PROBLEM — E66.01 SEVERE OBESITY (HCC): Status: ACTIVE | Noted: 2019-04-05

## 2022-08-19 ENCOUNTER — DOCUMENTATION ONLY (OUTPATIENT)
Dept: SURGERY | Age: 52
End: 2022-08-19

## 2022-08-19 NOTE — PROGRESS NOTES
Per [facility] requirements;  E-mail and letter sent for follow up appointment. [facility] Loss Suresh [address]      Dear Patient,    Your health is our main concern. It is important for your health to have follow-up lab work and to see your surgeon at 3 months, 6 months and annually after your weight loss surgery. Additionally, the Department of bariatric Surgery at our hospital is a member of the Metabolic and Bariatric Surgery Accreditation and Quality Improvement Program [facility]). As a participant in this program, we gather information on the outcomes of our patients after surgery. Please call the office for a follow up appointment at [phone] [facility] [place]) or [phone] [facility]). If you have moved out of the area or have changed surgeons please call us and let us know the name of your doctor. Your health and feedback are important to us. We greatly appreciate your response.        Thank you,  [facility] Loss [address] [facility]

## 2024-06-05 ENCOUNTER — NEW PATIENT (OUTPATIENT)
Dept: URBAN - METROPOLITAN AREA CLINIC 1 | Facility: CLINIC | Age: 54
End: 2024-06-05

## 2024-06-05 DIAGNOSIS — H52.221: ICD-10-CM

## 2024-06-05 DIAGNOSIS — H52.4: ICD-10-CM

## 2024-06-05 DIAGNOSIS — Z01.00: ICD-10-CM

## 2024-06-05 DIAGNOSIS — H52.03: ICD-10-CM

## 2024-06-05 PROCEDURE — 92004 COMPRE OPH EXAM NEW PT 1/>: CPT

## 2024-06-05 PROCEDURE — 92015 DETERMINE REFRACTIVE STATE: CPT

## 2024-06-05 ASSESSMENT — VISUAL ACUITY
OS_CC: J10
OD_CC: J10
OS_SC: 20/80
OS_PH: 20/60
OD_SC: 20/60-2
OD_PH: 20/50

## 2024-06-05 ASSESSMENT — TONOMETRY
OS_IOP_MMHG: 19
OD_IOP_MMHG: 17

## 2025-07-10 ENCOUNTER — COMPREHENSIVE EXAM (OUTPATIENT)
Age: 55
End: 2025-07-10

## 2025-07-10 DIAGNOSIS — H52.03: ICD-10-CM

## 2025-07-10 DIAGNOSIS — Z01.00: ICD-10-CM

## 2025-07-10 DIAGNOSIS — H52.4: ICD-10-CM

## 2025-07-10 DIAGNOSIS — H52.221: ICD-10-CM

## 2025-07-10 PROCEDURE — 92014 COMPRE OPH EXAM EST PT 1/>: CPT

## 2025-07-10 PROCEDURE — 92015 DETERMINE REFRACTIVE STATE: CPT

## (undated) DEVICE — RELOAD STPL L60MM H1-2.6MM MESENTERY THN TISS WHT 6 ROW

## (undated) DEVICE — KIT CATH OD16FR 5ML BLLN SIL URIN INDWL STR TIP INF CTRL

## (undated) DEVICE — TROCAR ENDOSCP SHFT L100MM DIA12MM INTEGR STBL ENDOPATH

## (undated) DEVICE — KENDALL SCD EXPRESS SLEEVES, KNEE LENGTH, MEDIUM: Brand: KENDALL SCD

## (undated) DEVICE — SHEARS: Brand: ENDO SHEARS

## (undated) DEVICE — STERILE POLYISOPRENE POWDER-FREE SURGICAL GLOVES: Brand: PROTEXIS

## (undated) DEVICE — STAPLER SKIN L440MM 32MM LNG 12 FIRING B FRM PWR + GRIPPING

## (undated) DEVICE — PACK PROCEDURE SURG LAPAROSCOPY 17X7 MM BRTRC PRIMUS

## (undated) DEVICE — SUT SLK 2-0SH 30IN BLK --

## (undated) DEVICE — SHEARS RMFG AUTOSUTURE ENDO --

## (undated) DEVICE — REM POLYHESIVE ADULT PATIENT RETURN ELECTRODE: Brand: VALLEYLAB

## (undated) DEVICE — SUTURE VCRL SZ 3-0 L27IN ABSRB VLT L26MM SH 1/2 CIR J316H

## (undated) DEVICE — SOL IRR STRL H2O 500ML STRL --

## (undated) DEVICE — STAPLE INT WHT BLU G GRN BLK REINF FOR ENDOPATH ECHELON FLX

## (undated) DEVICE — TOWEL SURG W16XL26IN BLU NONFENESTRATED DLX ST 2 PER PK

## (undated) DEVICE — SUTURE VCRL SZ 2-0 L54IN ABSRB VLT W/O NDL POLYGLACTIN 910 J618H

## (undated) DEVICE — DISPOSABLE SUCTION/IRRIGATOR TUBE SET WITH TIP: Brand: AHTO

## (undated) DEVICE — SUTURE MCRYL SZ 4-0 L27IN ABSRB UD L24MM PS-1 3/8 CIR PRIM Y935H

## (undated) DEVICE — TROCAR LAP L100MM DIA5MM BLDELSS W/ STBL SL ENDOPATH XCEL

## (undated) DEVICE — LIGHT HANDLE: Brand: DEVON

## (undated) DEVICE — RELOAD STPL L60MM H1.5-3.6MM REG TISS BLU GRIPPING SURF B

## (undated) DEVICE — SHEAR HARMONIC ACET 5MMX36CM -- ACE PLUS

## (undated) DEVICE — SLEEVE TRCR L100MM DIA5MM UNIV STBL FOR BLDELSS DIL TIP

## (undated) DEVICE — TROCAR ENDOSCP BLDELSS 12X100 MM W/ HNDL STBL SL OPT TIP

## (undated) DEVICE — PREP SKN CHLORHEX GLUC 8OZ --

## (undated) DEVICE — TRUE CONTENT TO BE POPULATED AS PART OF REBRANDING: Brand: ARGYLE

## (undated) DEVICE — KIT,ANTI FOG,W/SPONGE & FLUID,SOFT PACK: Brand: MEDLINE